# Patient Record
Sex: MALE | Race: WHITE | NOT HISPANIC OR LATINO | URBAN - METROPOLITAN AREA
[De-identification: names, ages, dates, MRNs, and addresses within clinical notes are randomized per-mention and may not be internally consistent; named-entity substitution may affect disease eponyms.]

---

## 2018-10-09 ENCOUNTER — EMERGENCY (EMERGENCY)
Facility: HOSPITAL | Age: 73
LOS: 1 days | Discharge: HOME | End: 2018-10-09
Attending: EMERGENCY MEDICINE
Payer: COMMERCIAL

## 2018-10-09 VITALS
DIASTOLIC BLOOD PRESSURE: 559 MMHG | HEART RATE: 58 BPM | SYSTOLIC BLOOD PRESSURE: 125 MMHG | RESPIRATION RATE: 18 BRPM | TEMPERATURE: 98 F | OXYGEN SATURATION: 96 %

## 2018-10-09 VITALS — WEIGHT: 220.02 LBS

## 2018-10-09 LAB
ALBUMIN SERPL ELPH-MCNC: 4 G/DL — SIGNIFICANT CHANGE UP (ref 3.3–5.2)
ALP SERPL-CCNC: 69 U/L — SIGNIFICANT CHANGE UP (ref 40–120)
ALT FLD-CCNC: 119 U/L — HIGH
ANION GAP SERPL CALC-SCNC: 13 MMOL/L — SIGNIFICANT CHANGE UP (ref 5–17)
APPEARANCE UR: CLEAR — SIGNIFICANT CHANGE UP
APTT BLD: 29.1 SEC — SIGNIFICANT CHANGE UP (ref 27.5–37.4)
AST SERPL-CCNC: 100 U/L — HIGH
BACTERIA # UR AUTO: ABNORMAL
BASOPHILS # BLD AUTO: 0 K/UL — SIGNIFICANT CHANGE UP (ref 0–0.2)
BASOPHILS NFR BLD AUTO: 0.2 % — SIGNIFICANT CHANGE UP (ref 0–2)
BILIRUB SERPL-MCNC: 0.6 MG/DL — SIGNIFICANT CHANGE UP (ref 0.4–2)
BILIRUB UR-MCNC: NEGATIVE — SIGNIFICANT CHANGE UP
BUN SERPL-MCNC: 16 MG/DL — SIGNIFICANT CHANGE UP (ref 8–20)
CALCIUM SERPL-MCNC: 9.1 MG/DL — SIGNIFICANT CHANGE UP (ref 8.6–10.2)
CHLORIDE SERPL-SCNC: 96 MMOL/L — LOW (ref 98–107)
CO2 SERPL-SCNC: 24 MMOL/L — SIGNIFICANT CHANGE UP (ref 22–29)
COLOR SPEC: YELLOW — SIGNIFICANT CHANGE UP
CREAT SERPL-MCNC: 0.92 MG/DL — SIGNIFICANT CHANGE UP (ref 0.5–1.3)
DIFF PNL FLD: ABNORMAL
EOSINOPHIL # BLD AUTO: 0 K/UL — SIGNIFICANT CHANGE UP (ref 0–0.5)
EOSINOPHIL NFR BLD AUTO: 0.4 % — SIGNIFICANT CHANGE UP (ref 0–6)
EPI CELLS # UR: SIGNIFICANT CHANGE UP
GLUCOSE SERPL-MCNC: 141 MG/DL — HIGH (ref 70–115)
GLUCOSE UR QL: NEGATIVE MG/DL — SIGNIFICANT CHANGE UP
HCT VFR BLD CALC: 42.7 % — SIGNIFICANT CHANGE UP (ref 42–52)
HGB BLD-MCNC: 14.7 G/DL — SIGNIFICANT CHANGE UP (ref 14–18)
INR BLD: 1.26 RATIO — HIGH (ref 0.88–1.16)
KETONES UR-MCNC: NEGATIVE — SIGNIFICANT CHANGE UP
LACTATE BLDV-MCNC: 1.2 MMOL/L — SIGNIFICANT CHANGE UP (ref 0.5–2)
LEUKOCYTE ESTERASE UR-ACNC: ABNORMAL
LYMPHOCYTES # BLD AUTO: 0.7 K/UL — LOW (ref 1–4.8)
LYMPHOCYTES # BLD AUTO: 14.3 % — LOW (ref 20–55)
MCHC RBC-ENTMCNC: 29.9 PG — SIGNIFICANT CHANGE UP (ref 27–31)
MCHC RBC-ENTMCNC: 34.4 G/DL — SIGNIFICANT CHANGE UP (ref 32–36)
MCV RBC AUTO: 86.8 FL — SIGNIFICANT CHANGE UP (ref 80–94)
MONOCYTES # BLD AUTO: 0.4 K/UL — SIGNIFICANT CHANGE UP (ref 0–0.8)
MONOCYTES NFR BLD AUTO: 8.9 % — SIGNIFICANT CHANGE UP (ref 3–10)
NEUTROPHILS # BLD AUTO: 3.6 K/UL — SIGNIFICANT CHANGE UP (ref 1.8–8)
NEUTROPHILS NFR BLD AUTO: 76 % — HIGH (ref 37–73)
NITRITE UR-MCNC: NEGATIVE — SIGNIFICANT CHANGE UP
PH UR: 6 — SIGNIFICANT CHANGE UP (ref 5–8)
PLATELET # BLD AUTO: 179 K/UL — SIGNIFICANT CHANGE UP (ref 150–400)
POTASSIUM SERPL-MCNC: 4.2 MMOL/L — SIGNIFICANT CHANGE UP (ref 3.5–5.3)
POTASSIUM SERPL-SCNC: 4.2 MMOL/L — SIGNIFICANT CHANGE UP (ref 3.5–5.3)
PROT SERPL-MCNC: 6.8 G/DL — SIGNIFICANT CHANGE UP (ref 6.6–8.7)
PROT UR-MCNC: 15 MG/DL
PROTHROM AB SERPL-ACNC: 13.9 SEC — HIGH (ref 9.8–12.7)
RBC # BLD: 4.92 M/UL — SIGNIFICANT CHANGE UP (ref 4.6–6.2)
RBC # FLD: 13.2 % — SIGNIFICANT CHANGE UP (ref 11–15.6)
RBC CASTS # UR COMP ASSIST: ABNORMAL /HPF (ref 0–4)
SODIUM SERPL-SCNC: 133 MMOL/L — LOW (ref 135–145)
SP GR SPEC: 1.01 — SIGNIFICANT CHANGE UP (ref 1.01–1.02)
UROBILINOGEN FLD QL: NEGATIVE MG/DL — SIGNIFICANT CHANGE UP
WBC # BLD: 4.7 K/UL — LOW (ref 4.8–10.8)
WBC # FLD AUTO: 4.7 K/UL — LOW (ref 4.8–10.8)
WBC UR QL: SIGNIFICANT CHANGE UP

## 2018-10-09 PROCEDURE — 96365 THER/PROPH/DIAG IV INF INIT: CPT

## 2018-10-09 PROCEDURE — 74176 CT ABD & PELVIS W/O CONTRAST: CPT

## 2018-10-09 PROCEDURE — 85730 THROMBOPLASTIN TIME PARTIAL: CPT

## 2018-10-09 PROCEDURE — 96367 TX/PROPH/DG ADDL SEQ IV INF: CPT

## 2018-10-09 PROCEDURE — 96366 THER/PROPH/DIAG IV INF ADDON: CPT

## 2018-10-09 PROCEDURE — 80053 COMPREHEN METABOLIC PANEL: CPT

## 2018-10-09 PROCEDURE — 99284 EMERGENCY DEPT VISIT MOD MDM: CPT | Mod: 25

## 2018-10-09 PROCEDURE — 71045 X-RAY EXAM CHEST 1 VIEW: CPT | Mod: 26

## 2018-10-09 PROCEDURE — 85610 PROTHROMBIN TIME: CPT

## 2018-10-09 PROCEDURE — 85027 COMPLETE CBC AUTOMATED: CPT

## 2018-10-09 PROCEDURE — 83605 ASSAY OF LACTIC ACID: CPT

## 2018-10-09 PROCEDURE — 71045 X-RAY EXAM CHEST 1 VIEW: CPT

## 2018-10-09 PROCEDURE — 87040 BLOOD CULTURE FOR BACTERIA: CPT

## 2018-10-09 PROCEDURE — 74176 CT ABD & PELVIS W/O CONTRAST: CPT | Mod: 26

## 2018-10-09 PROCEDURE — 87086 URINE CULTURE/COLONY COUNT: CPT

## 2018-10-09 PROCEDURE — 81001 URINALYSIS AUTO W/SCOPE: CPT

## 2018-10-09 PROCEDURE — 36415 COLL VENOUS BLD VENIPUNCTURE: CPT

## 2018-10-09 RX ORDER — SODIUM CHLORIDE 9 MG/ML
3000 INJECTION INTRAMUSCULAR; INTRAVENOUS; SUBCUTANEOUS ONCE
Qty: 0 | Refills: 0 | Status: COMPLETED | OUTPATIENT
Start: 2018-10-09 | End: 2018-10-09

## 2018-10-09 RX ORDER — PIPERACILLIN AND TAZOBACTAM 4; .5 G/20ML; G/20ML
3.38 INJECTION, POWDER, LYOPHILIZED, FOR SOLUTION INTRAVENOUS ONCE
Qty: 0 | Refills: 0 | Status: COMPLETED | OUTPATIENT
Start: 2018-10-09 | End: 2018-10-09

## 2018-10-09 RX ORDER — ACETAMINOPHEN 500 MG
650 TABLET ORAL ONCE
Qty: 0 | Refills: 0 | Status: COMPLETED | OUTPATIENT
Start: 2018-10-09 | End: 2018-10-09

## 2018-10-09 RX ORDER — IBUPROFEN 200 MG
800 TABLET ORAL ONCE
Qty: 0 | Refills: 0 | Status: COMPLETED | OUTPATIENT
Start: 2018-10-09 | End: 2018-10-09

## 2018-10-09 RX ORDER — VANCOMYCIN HCL 1 G
1000 VIAL (EA) INTRAVENOUS ONCE
Qty: 0 | Refills: 0 | Status: COMPLETED | OUTPATIENT
Start: 2018-10-09 | End: 2018-10-09

## 2018-10-09 RX ADMIN — SODIUM CHLORIDE 3000 MILLILITER(S): 9 INJECTION INTRAMUSCULAR; INTRAVENOUS; SUBCUTANEOUS at 11:02

## 2018-10-09 RX ADMIN — SODIUM CHLORIDE 3000 MILLILITER(S): 9 INJECTION INTRAMUSCULAR; INTRAVENOUS; SUBCUTANEOUS at 07:53

## 2018-10-09 RX ADMIN — Medication 650 MILLIGRAM(S): at 07:53

## 2018-10-09 RX ADMIN — Medication 800 MILLIGRAM(S): at 10:03

## 2018-10-09 RX ADMIN — PIPERACILLIN AND TAZOBACTAM 3.38 GRAM(S): 4; .5 INJECTION, POWDER, LYOPHILIZED, FOR SOLUTION INTRAVENOUS at 09:41

## 2018-10-09 RX ADMIN — Medication 650 MILLIGRAM(S): at 09:00

## 2018-10-09 RX ADMIN — Medication 800 MILLIGRAM(S): at 11:02

## 2018-10-09 RX ADMIN — Medication 1000 MILLIGRAM(S): at 11:02

## 2018-10-09 RX ADMIN — PIPERACILLIN AND TAZOBACTAM 200 GRAM(S): 4; .5 INJECTION, POWDER, LYOPHILIZED, FOR SOLUTION INTRAVENOUS at 07:53

## 2018-10-09 RX ADMIN — Medication 250 MILLIGRAM(S): at 09:41

## 2018-10-09 NOTE — ED PROVIDER NOTE - PROGRESS NOTE DETAILS
The patient appears well and feels well and has no symptoms and wants to go home. No HA, No CP, No SOB, No cough, No abd pain currently.  Lab WNL and will DC home and follow up with PMD

## 2018-10-09 NOTE — ED PROVIDER NOTE - OBJECTIVE STATEMENT
The patient is a 72 year old male presents with fever, dysuria, hesitancy and bodyache. No CP, No SOB, No sick contact, No abd pain

## 2018-10-11 LAB
CULTURE RESULTS: SIGNIFICANT CHANGE UP
SPECIMEN SOURCE: SIGNIFICANT CHANGE UP

## 2018-10-14 LAB
CULTURE RESULTS: SIGNIFICANT CHANGE UP
CULTURE RESULTS: SIGNIFICANT CHANGE UP
SPECIMEN SOURCE: SIGNIFICANT CHANGE UP
SPECIMEN SOURCE: SIGNIFICANT CHANGE UP

## 2021-08-17 PROBLEM — I10 ESSENTIAL (PRIMARY) HYPERTENSION: Chronic | Status: ACTIVE | Noted: 2018-10-09

## 2021-08-17 PROBLEM — K21.9 GASTRO-ESOPHAGEAL REFLUX DISEASE WITHOUT ESOPHAGITIS: Chronic | Status: ACTIVE | Noted: 2018-10-09

## 2021-08-19 PROBLEM — Z00.00 ENCOUNTER FOR PREVENTIVE HEALTH EXAMINATION: Status: ACTIVE | Noted: 2021-08-19

## 2021-08-20 ENCOUNTER — APPOINTMENT (OUTPATIENT)
Dept: MRI IMAGING | Facility: CLINIC | Age: 76
End: 2021-08-20
Payer: MEDICARE

## 2021-08-20 ENCOUNTER — OUTPATIENT (OUTPATIENT)
Dept: OUTPATIENT SERVICES | Facility: HOSPITAL | Age: 76
LOS: 1 days | End: 2021-08-20
Payer: MEDICARE

## 2021-08-20 DIAGNOSIS — Z00.00 ENCOUNTER FOR GENERAL ADULT MEDICAL EXAMINATION WITHOUT ABNORMAL FINDINGS: ICD-10-CM

## 2021-08-20 PROCEDURE — 73721 MRI JNT OF LWR EXTRE W/O DYE: CPT | Mod: MH

## 2021-08-20 PROCEDURE — 73721 MRI JNT OF LWR EXTRE W/O DYE: CPT | Mod: 26,LT,MH

## 2021-10-04 ENCOUNTER — TRANSCRIPTION ENCOUNTER (OUTPATIENT)
Age: 76
End: 2021-10-04

## 2022-09-04 ENCOUNTER — NON-APPOINTMENT (OUTPATIENT)
Age: 77
End: 2022-09-04

## 2022-09-19 ENCOUNTER — APPOINTMENT (OUTPATIENT)
Dept: ORTHOPEDIC SURGERY | Facility: CLINIC | Age: 77
End: 2022-09-19

## 2022-09-19 VITALS — WEIGHT: 220 LBS | BODY MASS INDEX: 32.58 KG/M2 | HEIGHT: 69 IN

## 2022-09-19 DIAGNOSIS — Z78.9 OTHER SPECIFIED HEALTH STATUS: ICD-10-CM

## 2022-09-19 DIAGNOSIS — Z63.4 DISAPPEARANCE AND DEATH OF FAMILY MEMBER: ICD-10-CM

## 2022-09-19 PROCEDURE — 99214 OFFICE O/P EST MOD 30 MIN: CPT

## 2022-09-19 SDOH — SOCIAL STABILITY - SOCIAL INSECURITY: DISSAPEARANCE AND DEATH OF FAMILY MEMBER: Z63.4

## 2022-09-19 NOTE — DISCUSSION/SUMMARY
[de-identified] : Patient will begin pt for hip OA. He will follow up with our hip service if his symptoms continue. \par \par "Written by Andrew Briones, acting as Scribe for Brian Fabian M.D" \par \par Home Exercise \par \par  The patient is instructed on a home exercise program. \par  \par RICE \par I explained to the patient that rest, ice, compression, and elevation would benefit them.  They may return to activity after follow-up or when they no longer have any pain. \par  \par Pain Guide Activities \par The patient was advised to let pain guide the gradual advancement of activities. \par  \par Activity Modification \par The patient was advised to modify their activities. \par  \par Dx / Natural History \par The patient was advised of the diagnosis.  The natural history of the pathology was explained in full to the patient in layman's terms.  Several different treatment options were discussed and explained in full to the patient including the risks and benefits of both surgical and non-surgical treatments.  All questions and concerns were answered.\par

## 2022-09-19 NOTE — PHYSICAL EXAM
[5___] : extension 5[unfilled]/5 [2+] : posterior tibialis pulse: 2+ [] : patient ambulates without assistive device [Right] : right hip with pelvis [de-identified] : Neurologic: normal coordination, normal DTR UE/LE , normal sensation, normal mood and affect, orientated and able to communicate.\par \par Skin: normal skin, no rash, no ulcers and no lesions.\par \par Lymphatic: no obvious lymphadenopathy in areas examined.\par \par Constitutional: well developed and well nourished.\par \par Cardiovascular: peripheral vascular exam is grossly normal.\par \par Pulmonary: no respiratory distress, lungs clear to auscultation bilaterally.\par \par Abdomen: normal bowel sounds, non-tender, no HSM and no mass.\par  [FreeTextEntry3] : gluteal tenderness, lateral thigh tenderness.  [FreeTextEntry9] : [facility] 9/5/22 rt hip xray: Mild right hip arthrosis.

## 2022-09-19 NOTE — HISTORY OF PRESENT ILLNESS
[de-identified] : The patient is a 76 year old R hand dominant male who presents today complaining of right hip pain.  \par Date of Injury/Onset: 10 days\par Pain:    At Rest: _/10 \par With Activity:  _/10 \par Mechanism of injury: took a fall playing racquet ball\par This is not a Work Related Injury being treated under Worker's Compensation.\par This is not an athletic injury occurring associated with an interscholastic or organized sports team.\par Quality of symptoms: sharp ache\par Improves with:  ice, volteran\par Worse with: weight bearing\par Prior treatment:  GP\par Prior Imaging: [facility]\par Out of work/sport: _, since _\par School/Sport/Position/Occupation:_\par Additional Information: None\par \par

## 2023-06-20 ENCOUNTER — APPOINTMENT (OUTPATIENT)
Dept: ORTHOPEDIC SURGERY | Facility: CLINIC | Age: 78
End: 2023-06-20
Payer: MEDICARE

## 2023-06-20 PROCEDURE — 73110 X-RAY EXAM OF WRIST: CPT | Mod: RT

## 2023-06-20 PROCEDURE — 99214 OFFICE O/P EST MOD 30 MIN: CPT

## 2023-06-20 NOTE — IMAGING
[de-identified] : RIGHT HAND EXAM\par +ttp at fovea/ulnar styloid\par Skin intact\par No deformity, edema, ecchymosis\par Warm and well perfused\par Brisk capillary refill throughout\par Motor function intact AIN, PIN, and ulnar nerves\par Sensation intact to light touch in median, ulnar, and radial nerves\par Finger and wrist motion is intact and full\par Patient is able to make a composite fist\par \par Right wrist radiographs with no overt fracture nor dislocation. Carpus aligned.

## 2023-06-20 NOTE — HISTORY OF PRESENT ILLNESS
[de-identified] : 77M, RHD presents with right wrist pain due to playing racket ball end of May 2023, reports falling on stone floor 2 weeks after. Patient admits to experiencing soreness that has progressively got worst. Admits to using Voltaren with no relief, using a brace with significant relief. Denies numbness/ tingling.

## 2023-06-20 NOTE — ASSESSMENT
[FreeTextEntry1] : Right wrist sprain/contusion - reviewed radiographs and overall pathoanatomy with patient. Discussed management will consist of NSAIDs prn, wrist brace, activity modification.\par \par F/u 3 weeks

## 2023-07-11 ENCOUNTER — APPOINTMENT (OUTPATIENT)
Dept: ORTHOPEDIC SURGERY | Facility: CLINIC | Age: 78
End: 2023-07-11
Payer: MEDICARE

## 2023-07-11 DIAGNOSIS — S63.509A UNSPECIFIED SPRAIN OF UNSPECIFIED WRIST, INITIAL ENCOUNTER: ICD-10-CM

## 2023-07-11 PROCEDURE — 99213 OFFICE O/P EST LOW 20 MIN: CPT

## 2023-07-11 NOTE — IMAGING
[de-identified] : RIGHT HAND EXAM\par +ttp at fovea/ulnar styloid\par Skin intact\par No deformity, edema, ecchymosis\par Warm and well perfused\par Brisk capillary refill throughout\par Motor function intact AIN, PIN, and ulnar nerves\par Sensation intact to light touch in median, ulnar, and radial nerves\par Finger and wrist motion is intact and full\par Patient is able to make a composite fist\par \par Right wrist radiographs with no overt fracture nor dislocation. Carpus aligned.

## 2023-07-11 NOTE — ASSESSMENT
[FreeTextEntry1] : Right wrist sprain/contusion - reviewed radiographs and overall pathoanatomy with patient. Discussed management will consist of NSAIDs prn, wrist brace, activity modification.\par \par F/u prn

## 2023-07-11 NOTE — HISTORY OF PRESENT ILLNESS
[de-identified] : 77M, RHD presents with right wrist pain due to playing racket ball end of May 2023, reports falling on stone floor 2 weeks after. Patient admits to experiencing soreness that has progressively got worst. Admits to using Voltaren with no relief, using a brace with significant relief. Denies numbness/ tingling. \par \par 7/11/23: f/u right wrist pain. Patient reports continues to experience pain. Denies OT/ PT treatment. Denies numbness/ tingling.

## 2023-09-13 ENCOUNTER — NON-APPOINTMENT (OUTPATIENT)
Age: 78
End: 2023-09-13

## 2024-02-27 ENCOUNTER — APPOINTMENT (OUTPATIENT)
Dept: PULMONOLOGY | Facility: CLINIC | Age: 79
End: 2024-02-27
Payer: MEDICARE

## 2024-02-27 VITALS
DIASTOLIC BLOOD PRESSURE: 68 MMHG | HEART RATE: 72 BPM | WEIGHT: 195 LBS | OXYGEN SATURATION: 97 % | TEMPERATURE: 97.2 F | SYSTOLIC BLOOD PRESSURE: 161 MMHG | BODY MASS INDEX: 28.88 KG/M2 | HEIGHT: 69 IN

## 2024-02-27 PROCEDURE — 99204 OFFICE O/P NEW MOD 45 MIN: CPT

## 2024-02-27 NOTE — CONSULT LETTER
[Dear  ___] : Dear  [unfilled], [FreeTextEntry1] : I had the pleasure of evaluating your patient, LISA CARRANZA , in the office today.  Please review my consultation and evaluation report that follows below.  Please do not hesitate to call me if further information is necessary or if you wish to discuss ongoing care or diagnostic work-up.    I very much appreciate your referral and it is a privilege to be able to provide care for your patient.  Sincerely,   Terrell Helm MD, MHCM, FACP, LIVAN-C Pulmonary Medicine  of Medicine [employer] [employer] [facility] [employer] at [facility] [email or web address] -Pulmonary in [address] [place]  [address]    Fax   Multi-Specialties at [address]  [phone]

## 2024-02-27 NOTE — PHYSICAL EXAM
[No Acute Distress] : no acute distress [Normal Oropharynx] : normal oropharynx [Normal Appearance] : normal appearance [Normal Rate/Rhythm] : normal rate/rhythm [No Neck Mass] : no neck mass [Normal S1, S2] : normal s1, s2 [No Resp Distress] : no resp distress [No Murmurs] : no murmurs [Clear to Auscultation Bilaterally] : clear to auscultation bilaterally [No Abnormalities] : no abnormalities [Benign] : benign [Normal Gait] : normal gait [No Clubbing] : no clubbing [No Cyanosis] : no cyanosis [No Edema] : no edema [FROM] : FROM [No Focal Deficits] : no focal deficits [Normal Color/ Pigmentation] : normal color/ pigmentation [Normal Affect] : normal affect [Oriented x3] : oriented x3

## 2024-02-27 NOTE — ASSESSMENT
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 HLD on no meds Advantage chart reviewed  Patient was worred about lung cancer--sent for CT at [facility] in [place] : Reviewed with patient 6 mm nodule surrounding a small bullae in lingula, also 3 mm nodule in Lingula--only issues  No histroy of TB, [place] [employer] [place]  Imp 77 yo gentleman with history 30+py smoking until 30 years ago HT and GERD noted CT chest done for concerns about lung cancer (wife  of LC) showed 6 mm and 3mm lesions in lingula Somewhat concerned about peribullous lesion Will obtain 3 month interim at [facility] and follow this lesion

## 2024-02-27 NOTE — HISTORY OF PRESENT ILLNESS
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 HLD on no meds Advantage chart reviewed  Patient was worred about lung cancer--sent for CT at [facility] in [place] Jan: Reviewed with patient 6 mm nodule surrounding a small bullae in lingula, also 3 mm nodule in Lingula--only issues  No histroy of TB, born and raised in [place] and [employer] [place] in [place]

## 2024-04-10 ENCOUNTER — NON-APPOINTMENT (OUTPATIENT)
Age: 79
End: 2024-04-10

## 2024-04-11 NOTE — ED ADULT NURSE NOTE - CADM POA URETHRAL CATHETER
ENDOSCOPY DISCHARGE INSTRUCTIONS    You may experience some lightheadedness for the next several hours.  Plan on quiet relaxation for the rest of today.  A responsible adult needs to stay with you today.  Because of the medications you received today-do not drive,operate machinery,or sign any contractual agreement for the next 24 hours.  Do not drink any alcoholic beverages or take any unprescribed medications tonight.  Eat bland food and avoid anything greasy or spicy initially-progress to your normal diet gradually.  Diet restrictions as instructed.  If you have any of the following problems, notify your physician or return to the hospital emergency room : fever, chills, excessive bleeding, excessive vomiting, difficulty swallowing, uncontrolled pain, increased abdominal distention, shortness of breath or any other problems.  If you have a sore throat, you may use lozenges or salt water gargles.  Please call Dr. Harris's office in 5 business days for biopsy results [phone].      ANESTHESIA DISCHARGE INSTRUCTIONS    Wear your seatbelt home.  You are under the influence of drugs-do not drink alcohol,drive,operate machinery,or make any important decisions or sign any legal documentsfor 24 hours  Children should not ride bikes,skate boards or play on gym sets  for 24 hours after surgery.  A responsible adult needs to be with you for 24 hours.  You may experience lightheadedness,dizziness,or sleepiness following surgery.  Rest at home today- increase activity as tolerated.  Progress slowly to a regular diet unless your physician has instructed you otherwise.Drink plenty of water.  If nausea becomes a problem call your physician.  Coughing,sore throat,and muscle aches are other side effects of anesthesia,and should improve with time.  Do not drive,operate machinery while taking narcotics.    
No
Billing Type: Third-Party Bill

## 2024-04-12 ENCOUNTER — APPOINTMENT (OUTPATIENT)
Dept: PULMONOLOGY | Facility: CLINIC | Age: 79
End: 2024-04-12
Payer: MEDICARE

## 2024-04-12 VITALS
HEART RATE: 74 BPM | SYSTOLIC BLOOD PRESSURE: 130 MMHG | DIASTOLIC BLOOD PRESSURE: 64 MMHG | OXYGEN SATURATION: 98 % | RESPIRATION RATE: 14 BRPM

## 2024-04-12 DIAGNOSIS — Z87.891 PERSONAL HISTORY OF NICOTINE DEPENDENCE: ICD-10-CM

## 2024-04-12 DIAGNOSIS — R91.1 SOLITARY PULMONARY NODULE: ICD-10-CM

## 2024-04-12 PROCEDURE — G2211 COMPLEX E/M VISIT ADD ON: CPT

## 2024-04-12 PROCEDURE — 85018 HEMOGLOBIN: CPT | Mod: QW

## 2024-04-12 PROCEDURE — 94729 DIFFUSING CAPACITY: CPT

## 2024-04-12 PROCEDURE — 94727 GAS DIL/WSHOT DETER LNG VOL: CPT

## 2024-04-12 PROCEDURE — 94010 BREATHING CAPACITY TEST: CPT

## 2024-04-12 PROCEDURE — 99213 OFFICE O/P EST LOW 20 MIN: CPT | Mod: 25

## 2024-04-12 PROCEDURE — 99213 OFFICE O/P EST LOW 20 MIN: CPT

## 2024-04-22 NOTE — ASSESSMENT
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 HLD on no meds Advantage chart reviewed  Patient was worried about lung cancer--sent for CT at [place] in [place] Jan: Reviewed with patient 6 mm nodule surrounding a small bullae in lingula, also 3 mm nodule in Lingula--only issues   Interim: Patient returns today: PFTs were reviewed with patient: No evidence of obstruction or restriction is noted   The patient had repeat CT scan obtain at [facility] last week Report is NOT available Reviewed films with the patient The nodule in lingula adjacent to bulla does not appear any different at this time   Imp 79 yo man with 30 py hx of smoking until 30 yrs ago His wife passed away due to lung cancer HT and GERD noted CT chest suggests stable lesion IF this is confirmed by the final reading, will repeat lung CT in 6 months and then return for f/u  ADDENDUM: Final reading confirms above Will plan 6 month repeat CT and then return here as originally planned and ordered Left message for patient

## 2024-04-22 NOTE — HISTORY OF PRESENT ILLNESS
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 HLD on no meds Advantage chart reviewed  Patient was worried about lung cancer--sent for CT at [facility] in [place] Jan: Reviewed with patient 6 mm nodule surrounding a small bullae in lingula, also 3 mm nodule in Lingula--only issues   Interim: Patient returns today: PFTs were reviewed with patient: No evidence of obstruction or restriction is noted   The patient had repeat CT scan obtain at [facility] last week Report is NOT available Reviewed films with the patient The nodule in lingula adjacent to bulla does not appear any different at this time  No histroy of TB, born and raised in [place] and [employer] in [place]. 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 HLD on no meds Advantage chart reviewed  Patient was worred about lung cancer--sent for CT at [facility] in [place] Jan: Reviewed with patient 6 mm nodule surrounding a small bullae in lingula, also 3 mm nodule in Lingula--only issues  No histroy of TB, born and raised in [place] and [employer] in [place].

## 2024-04-22 NOTE — CONSULT LETTER
[Dear  ___] : Dear  [unfilled], [FreeTextEntry1] : I had the pleasure of evaluating your patient, LISA CARRANZA , in the office today.  Please review my consultation and evaluation report that follows below.  Please do not hesitate to call me if further information is necessary or if you wish to discuss ongoing care or diagnostic work-up.    I very much appreciate your referral and it is a privilege to be able to provide care for your patient.  Sincerely,   Terrell Helm MD, MHCM, FACP, LIVAN-C Pulmonary Medicine  of Medicine [employer] [facility] [employer] [facility] at [facility] [email or web address] -[facility]ulmonary in [address] [place]  [address]    Fax   Multi-Specialties at [address]  [phone]

## 2024-04-23 ENCOUNTER — INPATIENT (INPATIENT)
Facility: HOSPITAL | Age: 79
LOS: 1 days | Discharge: HOME | DRG: 41 | End: 2024-04-25
Attending: PSYCHIATRY & NEUROLOGY | Admitting: STUDENT IN AN ORGANIZED HEALTH CARE EDUCATION/TRAINING PROGRAM
Payer: MEDICARE

## 2024-04-23 VITALS
DIASTOLIC BLOOD PRESSURE: 67 MMHG | HEART RATE: 51 BPM | OXYGEN SATURATION: 98 % | RESPIRATION RATE: 15 BRPM | TEMPERATURE: 98 F | SYSTOLIC BLOOD PRESSURE: 167 MMHG

## 2024-04-23 LAB
ALBUMIN SERPL ELPH-MCNC: 4.4 G/DL — SIGNIFICANT CHANGE UP (ref 3.3–5.2)
ALP SERPL-CCNC: 45 U/L — SIGNIFICANT CHANGE UP (ref 40–120)
ALT FLD-CCNC: 27 U/L — SIGNIFICANT CHANGE UP
ANION GAP SERPL CALC-SCNC: 11 MMOL/L — SIGNIFICANT CHANGE UP (ref 5–17)
APTT BLD: 30.5 SEC — SIGNIFICANT CHANGE UP (ref 24.5–35.6)
AST SERPL-CCNC: 27 U/L — SIGNIFICANT CHANGE UP
BASOPHILS # BLD AUTO: 0.04 K/UL — SIGNIFICANT CHANGE UP (ref 0–0.2)
BASOPHILS NFR BLD AUTO: 0.6 % — SIGNIFICANT CHANGE UP (ref 0–2)
BILIRUB SERPL-MCNC: 0.4 MG/DL — SIGNIFICANT CHANGE UP (ref 0.4–2)
BUN SERPL-MCNC: 21.9 MG/DL — HIGH (ref 8–20)
CALCIUM SERPL-MCNC: 9.8 MG/DL — SIGNIFICANT CHANGE UP (ref 8.4–10.5)
CHLORIDE SERPL-SCNC: 101 MMOL/L — SIGNIFICANT CHANGE UP (ref 96–108)
CO2 SERPL-SCNC: 27 MMOL/L — SIGNIFICANT CHANGE UP (ref 22–29)
CREAT SERPL-MCNC: 0.84 MG/DL — SIGNIFICANT CHANGE UP (ref 0.5–1.3)
EGFR: 89 ML/MIN/1.73M2 — SIGNIFICANT CHANGE UP
EOSINOPHIL # BLD AUTO: 0.22 K/UL — SIGNIFICANT CHANGE UP (ref 0–0.5)
EOSINOPHIL NFR BLD AUTO: 3.4 % — SIGNIFICANT CHANGE UP (ref 0–6)
GLUCOSE SERPL-MCNC: 97 MG/DL — SIGNIFICANT CHANGE UP (ref 70–99)
HCT VFR BLD CALC: 41.1 % — SIGNIFICANT CHANGE UP (ref 39–50)
HGB BLD-MCNC: 14 G/DL — SIGNIFICANT CHANGE UP (ref 13–17)
IMM GRANULOCYTES NFR BLD AUTO: 0.5 % — SIGNIFICANT CHANGE UP (ref 0–0.9)
INR BLD: 1.03 RATIO — SIGNIFICANT CHANGE UP (ref 0.85–1.18)
LYMPHOCYTES # BLD AUTO: 2.67 K/UL — SIGNIFICANT CHANGE UP (ref 1–3.3)
LYMPHOCYTES # BLD AUTO: 40.8 % — SIGNIFICANT CHANGE UP (ref 13–44)
MCHC RBC-ENTMCNC: 30.8 PG — SIGNIFICANT CHANGE UP (ref 27–34)
MCHC RBC-ENTMCNC: 34.1 GM/DL — SIGNIFICANT CHANGE UP (ref 32–36)
MCV RBC AUTO: 90.3 FL — SIGNIFICANT CHANGE UP (ref 80–100)
MONOCYTES # BLD AUTO: 0.56 K/UL — SIGNIFICANT CHANGE UP (ref 0–0.9)
MONOCYTES NFR BLD AUTO: 8.6 % — SIGNIFICANT CHANGE UP (ref 2–14)
NEUTROPHILS # BLD AUTO: 3.02 K/UL — SIGNIFICANT CHANGE UP (ref 1.8–7.4)
NEUTROPHILS NFR BLD AUTO: 46.1 % — SIGNIFICANT CHANGE UP (ref 43–77)
PLATELET # BLD AUTO: 233 K/UL — SIGNIFICANT CHANGE UP (ref 150–400)
POTASSIUM SERPL-MCNC: 4.7 MMOL/L — SIGNIFICANT CHANGE UP (ref 3.5–5.3)
POTASSIUM SERPL-SCNC: 4.7 MMOL/L — SIGNIFICANT CHANGE UP (ref 3.5–5.3)
PROT SERPL-MCNC: 6.7 G/DL — SIGNIFICANT CHANGE UP (ref 6.6–8.7)
PROTHROM AB SERPL-ACNC: 11.4 SEC — SIGNIFICANT CHANGE UP (ref 9.5–13)
RBC # BLD: 4.55 M/UL — SIGNIFICANT CHANGE UP (ref 4.2–5.8)
RBC # FLD: 13.8 % — SIGNIFICANT CHANGE UP (ref 10.3–14.5)
SODIUM SERPL-SCNC: 139 MMOL/L — SIGNIFICANT CHANGE UP (ref 135–145)
TROPONIN T, HIGH SENSITIVITY RESULT: 15 NG/L — SIGNIFICANT CHANGE UP (ref 0–51)
WBC # BLD: 6.54 K/UL — SIGNIFICANT CHANGE UP (ref 3.8–10.5)
WBC # FLD AUTO: 6.54 K/UL — SIGNIFICANT CHANGE UP (ref 3.8–10.5)

## 2024-04-23 PROCEDURE — 93010 ELECTROCARDIOGRAM REPORT: CPT

## 2024-04-23 PROCEDURE — 0042T: CPT | Mod: MC

## 2024-04-23 PROCEDURE — 99291 CRITICAL CARE FIRST HOUR: CPT

## 2024-04-23 PROCEDURE — 70498 CT ANGIOGRAPHY NECK: CPT | Mod: 26,MC

## 2024-04-23 PROCEDURE — 70496 CT ANGIOGRAPHY HEAD: CPT | Mod: 26,MC

## 2024-04-23 PROCEDURE — 70450 CT HEAD/BRAIN W/O DYE: CPT | Mod: 26,59,MC

## 2024-04-23 RX ORDER — ASPIRIN/CALCIUM CARB/MAGNESIUM 324 MG
325 TABLET ORAL DAILY
Refills: 0 | Status: DISCONTINUED | OUTPATIENT
Start: 2024-04-23 | End: 2024-04-24

## 2024-04-23 RX ORDER — CLOPIDOGREL BISULFATE 75 MG/1
300 TABLET, FILM COATED ORAL ONCE
Refills: 0 | Status: COMPLETED | OUTPATIENT
Start: 2024-04-23 | End: 2024-04-23

## 2024-04-23 RX ADMIN — CLOPIDOGREL BISULFATE 300 MILLIGRAM(S): 75 TABLET, FILM COATED ORAL at 21:06

## 2024-04-23 RX ADMIN — Medication 325 MILLIGRAM(S): at 21:06

## 2024-04-23 NOTE — ED PROVIDER NOTE - PHYSICAL EXAMINATION
Head: NC/AT  Neck: trachea midline  Resp:  No distress  Abd: soft, nd, nt  Ext: no deformities  Neuro:  Alert. Oriented to person, place. +APHASIA  Skin:  Warm and dry as visualized

## 2024-04-23 NOTE — ED PROVIDER NOTE - ATTENDING CONTRIBUTION TO CARE
seen with resident; code stroke activation, MSU, acute onset word finding difficulties since 1pm; ct with [facility] prehospital; not TNK candidate due to >6hrs, however, neurointerventionalist was contacted upon patient arrival to review perfusion scans. Patient with aphasia and command issues due to understanding on arrival, but symptoms are waxing and waning; see initial NIH below; but NIH resolved to 0 shortly after CT scans were completed. TBA, but interventional procedure cancelled due to improving status.     I personally saw the patient with the resident, and completed the key components of the history and physical exam. I then discussed the management plan with the resident.    I have personally provided _30__ minutes of critical care time exclusive of time spent on separately billable procedures. Time includes review of laboratory data, radiology results, discussion with consultants, and monitoring for potential decompensation. Interventions were performed as documented above

## 2024-04-23 NOTE — ED ADULT TRIAGE NOTE - CHIEF COMPLAINT QUOTE
PT BIB mobile stroke unit, LKW was 1PM today.  PT complaining of expressive aphagia, right sided facial droop.  As per mobile stroke unit pt noted to have a left MCA occlusion.  Symptoms have resolved upon arrival.  Code stroke called, pt brought to CT scan, MD at bedside.

## 2024-04-23 NOTE — ED PROVIDER NOTE - PROGRESS NOTE DETAILS
Meenu: On re-exam pt w/o aphasia. Full neuro exam now w/o significant findings. Code biplane cancelled by interventional team. Pt to be admitted to SDU, dual ap load. q2 neuro checks.

## 2024-04-23 NOTE — ED ADULT NURSE NOTE - ED STAT RN HANDOFF DETAILS
Patient admitted to SDU for NIH q2h.  Pt A&Ox4, current NIH 0.  Aspirin/plavix administered as ordered. VSS with no complaints voiced at this time.

## 2024-04-23 NOTE — ED ADULT NURSE NOTE - OBJECTIVE STATEMENT
Patient presents as code stoke with [facility] stroke unit confirmed left MCA occlusion, LKW 1pm, c/o aphasia, right sided facial droop.   On arrival no symptoms present with NIH score of 0.  MD present on arrival.

## 2024-04-23 NOTE — PATIENT PROFILE ADULT - FALL HARM RISK - RISK INTERVENTIONS

## 2024-04-23 NOTE — PROGRESS NOTE ADULT - SUBJECTIVE AND OBJECTIVE BOX
STROKE ATTENDING ON CALL    Called by ED team--79 y/o M with hx of GERD, HTN, possible lung CA who was LKW 1PM.  Found to be aphasic.  Brought in by [facility] stroke unit--found to have distal LM2 occlusion.  Repeat imaging here shows contrast on noncontrast head CT, possible sulcal effacement in the posterior L temporal lobe.  CTP shows large area of penumbra, no core.  He is out of window for TNK, but have discussed case with Dr. Colin from Neuro IR--patient will be taken for mechanical thrombectomy.  Code BiPlane activated.    STROKE ATTENDING ON CALL    Called by ED team--79 y/o M with hx of GERD, HTN, possible lung CA who was LKW 1PM.  Found to be aphasic.  Brought in by [facility] stroke unit--found to have distal LM2 occlusion.  Repeat imaging here shows contrast on noncontrast head CT, possible sulcal effacement in the posterior L temporal lobe.  CTP shows large area of penumbra, no core.  He is out of window for TNK, but have discussed case with Dr. Colin from Neuro IR--patient will be taken for mechanical thrombectomy.  Code BiPlane activated.       Addendum 8:50PM:  Patient was examined by Dr. Colin and noted to be nonaphasic with NIHSS 0.  Suspected to have recanalized therefore he will not be taken for thrombectomy.  Discussed case with ED attg--will load him with ASA and Plavix and admit to Medicine Stepdown status with stroke workup.

## 2024-04-23 NOTE — ED PROVIDER NOTE - NS ED ROS FT
Review of Systems  SKIN: warm, dry w/ no rash or bleeding  RESPIRATORY: no cough or SOB  CARDIAC: no chest pain & no palpitations  GI: no abd pain, nausea, vomiting, diarrhea  MUSCULOSKELETAL:  no joint pain, swelling or redness  NEURO: Alert, oriented x3. +Trouble finding words.

## 2024-04-23 NOTE — ED PROVIDER NOTE - CLINICAL SUMMARY MEDICAL DECISION MAKING FREE TEXT BOX
ASSESSMENT:   LISA CARRANZA is a 79yo M who presented with aphasia since 1pm. Vitals stable. Physical exam w/ aphasia, benign abdomen, clear lungs.     PLAN: Admit SDU w/ dual ap therapy

## 2024-04-23 NOTE — ED PROVIDER NOTE - OBJECTIVE STATEMENT
LISA CARRANZA is a 79yo Male with PMH HTN, GERD who presents c/o word finding difficult. [facility] M2 [facility]. On arrival pt noted to have difficulty finding words. Taken urgently to CT scan. Stroke neurologist called and interventionalist consulted. Pt initially planned for mechanical thrombectomy but on re-evaluation found to be w/o aphasia and w/o and neurological deficits. Biplane cancelled. On ROS pt denies any symptoms of cp/sob, fevers, chills, nausea, vomiting, abdominal pain, urinary symptoms, weakness, confusion.

## 2024-04-24 DIAGNOSIS — I69.30 UNSPECIFIED SEQUELAE OF CEREBRAL INFARCTION: ICD-10-CM

## 2024-04-24 LAB
A1C WITH ESTIMATED AVERAGE GLUCOSE RESULT: 5.9 % — HIGH (ref 4–5.6)
ESTIMATED AVERAGE GLUCOSE: 123 MG/DL — HIGH (ref 68–114)

## 2024-04-24 PROCEDURE — 70551 MRI BRAIN STEM W/O DYE: CPT | Mod: 26

## 2024-04-24 PROCEDURE — 99223 1ST HOSP IP/OBS HIGH 75: CPT

## 2024-04-24 PROCEDURE — 71045 X-RAY EXAM CHEST 1 VIEW: CPT | Mod: 26

## 2024-04-24 RX ORDER — ASPIRIN/CALCIUM CARB/MAGNESIUM 324 MG
81 TABLET ORAL DAILY
Refills: 0 | Status: DISCONTINUED | OUTPATIENT
Start: 2024-04-24 | End: 2024-04-25

## 2024-04-24 RX ORDER — LOSARTAN POTASSIUM 100 MG/1
1 TABLET, FILM COATED ORAL
Refills: 0 | DISCHARGE

## 2024-04-24 RX ORDER — PANTOPRAZOLE SODIUM 20 MG/1
40 TABLET, DELAYED RELEASE ORAL
Refills: 0 | Status: DISCONTINUED | OUTPATIENT
Start: 2024-04-24 | End: 2024-04-25

## 2024-04-24 RX ORDER — CLOPIDOGREL BISULFATE 75 MG/1
75 TABLET, FILM COATED ORAL EVERY 24 HOURS
Refills: 0 | Status: DISCONTINUED | OUTPATIENT
Start: 2024-04-24 | End: 2024-04-25

## 2024-04-24 RX ORDER — OMEPRAZOLE 10 MG/1
1 CAPSULE, DELAYED RELEASE ORAL
Refills: 0 | DISCHARGE

## 2024-04-24 RX ORDER — ATORVASTATIN CALCIUM 80 MG/1
80 TABLET, FILM COATED ORAL AT BEDTIME
Refills: 0 | Status: DISCONTINUED | OUTPATIENT
Start: 2024-04-24 | End: 2024-04-25

## 2024-04-24 RX ORDER — HEPARIN SODIUM 5000 [USP'U]/ML
5000 INJECTION INTRAVENOUS; SUBCUTANEOUS EVERY 12 HOURS
Refills: 0 | Status: DISCONTINUED | OUTPATIENT
Start: 2024-04-24 | End: 2024-04-25

## 2024-04-24 RX ORDER — SODIUM CHLORIDE 9 MG/ML
1000 INJECTION INTRAMUSCULAR; INTRAVENOUS; SUBCUTANEOUS
Refills: 0 | Status: DISCONTINUED | OUTPATIENT
Start: 2024-04-24 | End: 2024-04-25

## 2024-04-24 RX ADMIN — HEPARIN SODIUM 5000 UNIT(S): 5000 INJECTION INTRAVENOUS; SUBCUTANEOUS at 07:00

## 2024-04-24 RX ADMIN — CLOPIDOGREL BISULFATE 75 MILLIGRAM(S): 75 TABLET, FILM COATED ORAL at 23:30

## 2024-04-24 RX ADMIN — ATORVASTATIN CALCIUM 80 MILLIGRAM(S): 80 TABLET, FILM COATED ORAL at 23:00

## 2024-04-24 RX ADMIN — Medication 81 MILLIGRAM(S): at 12:37

## 2024-04-24 RX ADMIN — PANTOPRAZOLE SODIUM 40 MILLIGRAM(S): 20 TABLET, DELAYED RELEASE ORAL at 07:00

## 2024-04-24 RX ADMIN — HEPARIN SODIUM 5000 UNIT(S): 5000 INJECTION INTRAVENOUS; SUBCUTANEOUS at 23:30

## 2024-04-24 RX ADMIN — SODIUM CHLORIDE 50 MILLILITER(S): 9 INJECTION INTRAMUSCULAR; INTRAVENOUS; SUBCUTANEOUS at 12:44

## 2024-04-24 NOTE — CONSULT NOTE ADULT - ASSESSMENT
INCOMPLETE NOTE    ASSESSMENT:     NEURO:   -Neurologically ---   -Continue close monitoring for neurologic deterioration    - Stroke neuro checks q _   - Permissive HTN or  SBP goal ____ avoiding rapid fluctuations and hypotension    -ANTITHROMBOTIC THERAPY:   -titrate statin to LDL goal less than 70  -MRI Brain w/o, MRA Head w/o and Neck w/contrast  -Dysphagia screen: pass/fail   -Physical therapy/OT/Speech eval/treatment.   -check TTE, cardiac monitoring w/ telemetry for now, further evaluation pending findings of noted workup  , +/- ILR             - patient should have all age and risk appropriate malignancy screenings with PCP or sooner if clinically suspected    -DVT ppx: Heparin s.c [] LMWH [] SCD[]    -maintain adequate hydration    -Na Goal: 135-145   -monitor for si/sx of infection   -Check LDL/A1C  -Stroke education     OTHER:  condition and plan of care d/w patient, questions and concerns addressed.     DISPOSITION: Rehab or home depending on PT eval once stable and workup is complete      CORE MEASURES:        Admission NIHSS:      Tenecteplase : [] YES [] NO      LDL/A1C:     Depression Screen- if depression hx and/or present      Statin Therapy:     Dysphagia Screen: [] PASS [] FAIL     Smoking [] YES [] NO      Afib [] YES [] NO     Stroke Education [] YES [] NO     ASSESSMENT: 77yo M with hx of HTN, GERD presented to ED by [facility] stroke unit with aphasia and found to have distal LM2 occlusion.  Repeat imaging here showed occlusive M2 segment left MCA clot at the inferior division, with   brain tissue at risk and retained contrast material, without gross evidence of acute intracranial hemorrhage. Pt was deemed out of window for tenecteplase,  case was discussed with Dr. Colin Neuro IR and initially code Biplane was activated for mechanical thrombectomy but called off after pt was examined by Dr. Colin and had full resolution of symptoms. In the ED pt loaded with Plavix and ASA and admitted for  further management. RN called to evaluate pt for mld aphasia noted on neurochecks. Pt seen at bedside, with mild aphasia with NIHSS 1 which was 0 previously. Pt had no other neurological deficit at that time, noted team reached out to Dr. Colin, recommended permissive hypertension and pt's symptoms mild and did not warranty acute intervention at this time.    Impression: Left MCA stroke, due to cerebral embolism- left MCA M2 occlusion. Embolic stroke of undetermined source.     NEURO:   -Neurologically with conduction aphasia.   -Continue close monitoring for neurologic deterioration    - Stroke neuro checks q 2 hours- notify on call stroke attending/Neuro IR for any further neurological decline or increase in NIHSS.   - Permissive HTN, SBP goal 130-180mmHg avoiding rapid fluctuations and hypotension, IVF in place.   -ANTITHROMBOTIC THERAPY: ASA 81mg daily, Clopidogrel 75mg daily- duration pending findings of noted workup.   -titrate statin to LDL goal less than 70  -MRI Brain w/o, MRA Head w/o and Neck w/contrast  -Dysphagia screen: passed, advance diet as tolerated - advanced to regular, no si/sx of dysphagia per patient and daughter   -Physical therapy/OT/Speech eval/treatment.   -stroke education     -CARDIOVASCULAR:  TTE pending , cardiac monitoring w/ telemetry for now- asx bradycardia at times , continue close monitoring, further evaluation pending findings of noted workup                              -HEMATOLOGY: H/H without anemia , Platelets 233, patient should have all age and risk appropriate malignancy screenings with PCP or sooner if clinically suspected      DVT ppx: Heparin s.c [x] LMWH []     PULMONARY: CXR pending, hx pulmonary nodules need to be followed closely, protecting airway, saturating well     RENAL: BUN slightly elevated/Cr within range, IVF course for adequate hydration, monitor urine output, maintain adequate hydration       Na Goal:  135-145     May: N    ID: afebrile, no leukocytosis, monitor for si/sx of infection     OTHER:  condition and plan of care d/w patient and daughter, questions and concerns addressed.     DISPOSITION: Rehab or home depending on PT eval once stable and workup is complete      CORE MEASURES:        Admission NIHSS: 0      Tenecteplase : [] YES [x] NO      LDL/A1C: p/5.9     Depression Screen- if depression hx and/or present      Statin Therapy: as noted      Dysphagia Screen: [x] PASS [] FAIL     Smoking [] YES [x] NO      Afib [] YES [x] NO     Stroke Education [] YES [] NO pending completion    Obtain screening lower extremity venous ultrasound in patients who meet 1 or more of the following criteria as patient is high risk for DVT/PE on admission:   [] History of DVT/PE  []Hypercoagulable states (Factor V Leiden, Cancer, OCP, etc. )  []Prolonged immobility (hemiplegia/hemiparesis/post operative or any other extended immobilization)  [] Transferred from outside facility (Rehab or Long term care)  [] Age </= to 50     ASSESSMENT: 79yo M with hx of HTN, GERD presented to ED by [facility] stroke unit with aphasia and found to have distal LM2 occlusion.  Repeat imaging here showed occlusive M2 segment left MCA clot at the inferior division, with   brain tissue at risk and retained contrast material, without gross evidence of acute intracranial hemorrhage. Pt was deemed out of window for tenecteplase,  case was discussed with Dr. Colin Neuro IR and initially code Biplane was activated for mechanical thrombectomy but called off after pt was examined by Dr. Colin and had full resolution of symptoms. In the ED pt loaded with Plavix and ASA and admitted for  further management. RN called to evaluate pt for mld aphasia noted on neurochecks. Pt seen at bedside, with mild aphasia with NIHSS 1 which was 0 previously. Pt had no other neurological deficit at that time, noted team reached out to Dr. Colin, recommended permissive hypertension and pt's symptoms mild and did not warranty acute intervention at this time.    Impression: Left MCA stroke, due to cerebral embolism- left MCA M2 occlusion. Embolic stroke of undetermined source.     NEURO:   -Neurologically with conduction aphasia.   -Continue close monitoring for neurologic deterioration    - Stroke neuro checks q 2 hours- notify on call stroke attending/Neuro IR for any further neurological decline or increase in NIHSS.   - Permissive HTN, SBP goal 130-180mmHg avoiding rapid fluctuations and hypotension, IVF in place.   -ANTITHROMBOTIC THERAPY: ASA 81mg daily, Clopidogrel 75mg daily- duration pending findings of noted workup.   -titrate statin to LDL goal less than 70  - MRI As above  -Dysphagia screen: passed, advance diet as tolerated - advanced to regular, no si/sx of dysphagia per patient and daughter   -Physical therapy/OT/Speech eval/treatment.   -stroke education     -CARDIOVASCULAR:  Episode of Atrial tachycardia  - Consult Cardiology.  Will likely need ILR prior to DC.   - TTE pending , cardiac monitoring w/ telemetry for now- asx bradycardia at times , continue close monitoring, further evaluation pending findings of noted workup                              -HEMATOLOGY: H/H without anemia , Platelets 233, patient should have all age and risk appropriate malignancy screenings with PCP or sooner if clinically suspected      DVT ppx: Heparin s.c [x] LMWH []     PULMONARY: CXR pending, hx pulmonary nodules need to be followed closely, protecting airway, saturating well     RENAL: BUN slightly elevated/Cr within range, IVF course for adequate hydration, monitor urine output, maintain adequate hydration       Na Goal:  135-145     May: N    ID: afebrile, no leukocytosis, monitor for si/sx of infection     OTHER:  condition and plan of care d/w patient and daughter, questions and concerns addressed.     DISPOSITION: Rehab or home depending on PT eval once stable and workup is complete      CORE MEASURES:        Admission NIHSS: 0      Tenecteplase : [] YES [x] NO      LDL/A1C: p/5.9     Depression Screen- if depression hx and/or present      Statin Therapy: as noted      Dysphagia Screen: [x] PASS [] FAIL     Smoking [] YES [x] NO      Afib [] YES [x] NO     Stroke Education [] YES [] NO pending completion    Obtain screening lower extremity venous ultrasound in patients who meet 1 or more of the following criteria as patient is high risk for DVT/PE on admission:   [] History of DVT/PE  []Hypercoagulable states (Factor V Leiden, Cancer, OCP, etc. )  []Prolonged immobility (hemiplegia/hemiparesis/post operative or any other extended immobilization)  [] Transferred from outside facility (Rehab or Long term care)  [] Age </= to 50     ASSESSMENT: 79yo M with hx of HTN, GERD presented to ED by [facility] stroke unit with aphasia and found to have distal LM2 occlusion.  Repeat imaging here showed occlusive M2 segment left MCA clot at the inferior division, with   brain tissue at risk and retained contrast material, without gross evidence of acute intracranial hemorrhage. Pt was deemed out of window for tenecteplase,  case was discussed with Dr. Colin Neuro IR and initially code Biplane was activated for mechanical thrombectomy but called off after pt was examined by Dr. Colin and had full resolution of symptoms. In the ED pt loaded with Plavix and ASA and admitted for  further management. RN called to evaluate pt for mld aphasia noted on neurochecks. Pt seen at bedside, with mild aphasia with NIHSS 1 which was 0 previously. Pt had no other neurological deficit at that time, noted team reached out to Dr. Colin, recommended permissive hypertension and pt's symptoms mild and did not warranty acute intervention at this time.    Impression: Left MCA stroke, due to cerebral embolism- left MCA M2 occlusion. Embolic stroke of undetermined source.     NEURO:   -Neurologically with conduction aphasia.   -Continue close monitoring for neurologic deterioration    - Stroke neuro checks q 2 hours- notify on call stroke attending/Neuro IR for any further neurological decline or increase in NIHSS.   - Permissive HTN, SBP goal 130-180mmHg avoiding rapid fluctuations and hypotension, IVF in place.   -ANTITHROMBOTIC THERAPY: ASA 81mg daily, Clopidogrel 75mg daily- duration pending findings of noted workup.   -titrate statin to LDL goal less than 70  - MRI As above  -Dysphagia screen: passed, advance diet as tolerated - advanced to regular, no si/sx of dysphagia per patient and daughter   -Physical therapy/OT/Speech eval/treatment.   -stroke education     -CARDIOVASCULAR:  Episode of Atrial tachycardia  - Consult Cardiology.  Will likely need ILR prior to DC.   - TTE pending , cardiac monitoring w/ telemetry for now- asx bradycardia at times , continue close monitoring, further evaluation pending findings of noted workup                              -HEMATOLOGY: H/H without anemia , Platelets 233, patient should have all age and risk appropriate malignancy screenings with PCP or sooner if clinically suspected      DVT ppx: Heparin s.c [x] LMWH []     PULMONARY: CXR pending, hx pulmonary nodules need to be followed closely, protecting airway, saturating well     RENAL: BUN slightly elevated/Cr within range, IVF course for adequate hydration, monitor urine output, maintain adequate hydration       Na Goal:  135-145     May: N    Endo: Prediabetic with a1c 5.9    ID: afebrile, no leukocytosis, monitor for si/sx of infection     OTHER:  condition and plan of care d/w patient and daughter, questions and concerns addressed.     DISPOSITION: Rehab or home depending on PT eval once stable and workup is complete      CORE MEASURES:        Admission NIHSS: 0      Tenecteplase : [] YES [x] NO      LDL/A1C: p/5.9     Depression Screen- if depression hx and/or present      Statin Therapy: as noted      Dysphagia Screen: [x] PASS [] FAIL     Smoking [] YES [x] NO      Afib [] YES [x] NO     Stroke Education [] YES [] NO pending completion    Obtain screening lower extremity venous ultrasound in patients who meet 1 or more of the following criteria as patient is high risk for DVT/PE on admission:   [] History of DVT/PE  []Hypercoagulable states (Factor V Leiden, Cancer, OCP, etc. )  []Prolonged immobility (hemiplegia/hemiparesis/post operative or any other extended immobilization)  [] Transferred from outside facility (Rehab or Long term care)  [] Age </= to 50

## 2024-04-24 NOTE — PROVIDER CONTACT NOTE (OTHER) - SITUATION
[facility] Progress Note    Patient: Kiara Alcazar Date: 2019   : 1959 Attending: Silva Cazares MD         Admission date: 2019    Intensive Care Unit admission date:  19      ACTIVE PROBLEMS     -- Delirium-fluctuating   -- Hemorrhagic cystitis s/p cystoscopy on 19  -- Macrocytic anemia-received 1 U PRBC , stable today  -- EtOH  & Opiate abuse  -- Urinary retention. Tyson catheter placed by urology  -- Anxiety/Depression  -- Restless leg syndrome  -- Hx of COPD  -- Hx of DM          DISCUSSION/PLAN  Neuro:   Psychiatry consulted  This morning her confusion was behavioral.  Initially stated she didn't recall MRI or date.  When told I thought her memory was lacking because of her narcotics, she insisted this was not true and then stated the accurate date and events of MRI  On scheduled serqouel  On venlafaxine  Decision for LP was deferred as her mental status improved.  No meningeal signs.    MRI Lumber unremarkable     Pulmonary:  No acute issues        CV:  Continue metoprolol.     Renal:   No acute issues  Continue tyson      GI:   -- No acute issued  Regular diet     Heme:   -- Macrocytic anemia. Trending down Hb Normal TSH, folate, B12. No evidence of macroscopic GI bleed. FOBT positive wont explain hemoglobin drop  -- Hematology consult  -- Stable H&H this morning relatively stable, continue to monitor, no active bleeding      Endocrine:  -- continue the lantus and SSI     ID:   -Prior C Diff PCR positive but toxin negative. No prior or active infection. On PO vancomycin per Dr Cazares  -Started on antibiotics for possible UTI by  Dr Cazares  -Leukocytosis improving      Disposition: Transfer to medical floor     BEST PRACTICES:  - VTE prophylaxis: SCDs, UFH SC TID.  - SUP: Not indicated.  - LDA:Tyson catheter by urology for retention  - Nutrition: Oral  - Therapy/mobilization: activities as tolerated, PT/OT.           ================================================================    Subjective:  Pt admits to some LLE leg pain and ongoing back pain.  She knows her name and where she is, does not know year.  Hungry, ordering food off menu.      I/O last 3 completed shifts:  In: 840 [P.O.:840]  Out: 3125 [Urine:3125]  No intake/output data recorded.    Vital Last Value 24 Hour Range   Temperature 98.5 °F (36.9 °C) (01/28/19 2340) Temp  Min: 98.2 °F (36.8 °C)  Max: 99.1 °F (37.3 °C)   Pulse 86 (01/29/19 0600) Pulse  Min: 77  Max: 86   Respiratory 22 (01/29/19 0545) Resp  Min: 15  Max: 31   Non-Invasive  Blood Pressure 138/69 (01/29/19 0545) BP  Min: 100/56  Max: 138/69   Pulse Oximetry 97 % (01/29/19 0545) SpO2  Min: 93 %  Max: 98 %   Arterial   Blood Pressure   No Data Recorded                General appearance: alert, oriented to person and place and events.  Eating breakfast, sitting up in bed   Lungs: clear to auscultation bilaterally  Heart: regular rate and rhythm, S1, S2 normal, no murmur, click, rub or gallop  Abdomen: Soft, non-tender; bowel sounds normal; no masses, no hepatosplenomegaly  Extremities: extremities normal, atraumatic, no cyanosis or edema  Motor: moving all four extremities with weakness in the lower extremities      Pertinent Reviewed: Allergies, Medications, Labs and Physician and Nursing Notes       Dione Alexander MD  [facility]  Intensivist  [facility] f/u level III         Pt had run of A Tach, asymptomatic, VSS as charted, no acute distress noted.

## 2024-04-24 NOTE — H&P ADULT - NSHPPHYSICALEXAM_GEN_ALL_CORE
T(C): 36.7 (04-23-24 @ 23:58), Max: 36.7 (04-23-24 @ 23:58)  HR: 54 (04-23-24 @ 23:58) (51 - 54)  BP: 155/67 (04-23-24 @ 23:58) (153/72 - 167/67)  RR: 18 (04-23-24 @ 23:58) (15 - 18)  SpO2: 99% (04-23-24 @ 23:58) (98% - 99%)    GENERAL: patient appears well, no acute distress, appropriate, pleasant  EYES: sclera clear, no exudates  ENMT: oropharynx clear without erythema, no exudates, moist mucous membranes  NECK: supple, soft, no thyromegaly noted  LUNGS: good air entry bilaterally, clear to auscultation, symmetric breath sounds, no wheezing or rhonchi appreciated  HEART: soft S1/S2, regular rate and rhythm, no murmurs noted, no lower extremity edema  GASTROINTESTINAL: abdomen is soft, nontender, nondistended, normoactive bowel sounds, no palpable masses  INTEGUMENT: good skin turgor, warm skin, appears well perfused  MUSCULOSKELETAL: no clubbing or cyanosis, no obvious deformity  NEUROLOGIC: awake, alert, oriented x2-3, good muscle tone in 4 extremities, no obvious sensory deficits, mild aphasia   PSYCHIATRIC: mood is good  HEME/LYMPH: no palpable supraclavicular nodules, no obvious ecchymosis or petechiae

## 2024-04-24 NOTE — H&P ADULT - HISTORY OF PRESENT ILLNESS
79yo M with hx of HTN, GERD presented to ED by [facility] stroke unit with aphasia and found to have distal LM2 occlusion.  Repeat imaging here showed occlusive M2 segment left MCA clot at the inferior division, with   brain tissue at risk and retained contrast material, without gross evidence of acute intracranial hemorrhage. Pt was deemed out of window for TNK, but case was discussed with Dr. Colin Neuro IR and initially code Biplane was activated for mechanical thrombectomy but called after pt was examined by Dr. Colin and had full resolution of symptoms. In the ED pt loaded with Plavix and ASA and admitted further management. RN called to evaluate pt for mld aphasia noted on neurochecks. Pt seen at bedside, with mild aphasia with NIHSS 1 which was 0 previously. Pt had no other neurological decifit at this time, reach out to Dr. Colin, recommended permissive hypertension and pt's symptoms mild and does not warranty acute intervention at this time.            77yo M with hx of HTN, GERD presented to ED by [facility] stroke unit with aphasia and found to have distal LM2 occlusion.  Repeat imaging here showed occlusive M2 segment left MCA clot at the inferior division, with   brain tissue at risk and retained contrast material, without gross evidence of acute intracranial hemorrhage. Pt was deemed out of window for TNK, but case was discussed with Dr. Colin Neuro IR and initially code Biplane was activated for mechanical thrombectomy but called after pt was examined by Dr. Colin and had full resolution of symptoms. In the ED pt loaded with Plavix and ASA and admitted further management. RN called to evaluate pt for mld aphasia noted on neurochecks. Pt seen at bedside, with mild aphasia with NIHSS 1 which was 0 previously. Pt had no other neurological decifit at this time, reach out to Dr. Colin, recommended permissive hypertension and pt's symptoms mild and does not warranty acute intervention at this time. Pt has no other acute complaints at this time

## 2024-04-24 NOTE — PROVIDER CONTACT NOTE (CHANGE IN STATUS NOTIFICATION) - ASSESSMENT
NIH score is 1, pt aphasic . all other neurological assessments intact. PEERLA at 2mm.   VSS, pt bradycardic.

## 2024-04-24 NOTE — CONSULT NOTE ADULT - SUBJECTIVE AND OBJECTIVE BOX
INCOMPLETE NOTE    Preliminary note, official note pending attending review/signature.  Seen and examined by Stroke team attending/team, assessment/ plan as discussed with stroke team attending/team as noted.     [facility] Stroke Team    HPI:  77yo M with hx of HTN, GERD presented to ED by [facility] stroke unit with aphasia and found to have distal LM2 occlusion.  Repeat imaging here showed occlusive M2 segment left MCA clot at the inferior division, with   brain tissue at risk and retained contrast material, without gross evidence of acute intracranial hemorrhage. Pt was deemed out of window for TNK, but case was discussed with Dr. Colin Neuro IR and initially code Biplane was activated for mechanical thrombectomy but called off after pt was examined by Dr. Colin and had full resolution of symptoms. In the ED pt loaded with Plavix and ASA and admitted for  further management. RN called to evaluate pt for mld aphasia noted on neurochecks. Pt seen at bedside, with mild aphasia with NIHSS 1 which was 0 previously. Pt had no other neurological deficit at this time, reach out to Dr. Colin, recommended permissive hypertension and pt's symptoms mild and does not warranty acute intervention at this time. Pt has no other acute complaints at this time.         MEDICATIONS  (STANDING):  aspirin 325 milliGRAM(s) Oral daily  atorvastatin 80 milliGRAM(s) Oral at bedtime  clopidogrel Tablet 75 milliGRAM(s) Oral every 24 hours  heparin   Injectable 5000 Unit(s) SubCutaneous every 12 hours  pantoprazole    Tablet 40 milliGRAM(s) Oral before breakfast    Allergies: no known allergies    SOCIAL HISTORY:  no tobacco   drugs/alcohol use - unable to obtain 2/2 aphasia    ROS: 14 point ROS negative other than what is present in HPI or below    Vital Signs Last 24 Hrs  T(C): 36.6 (24 Apr 2024 06:20), Max: 36.7 (23 Apr 2024 23:58)  T(F): 97.9 (24 Apr 2024 06:20), Max: 98.1 (24 Apr 2024 02:00)  HR: 59 (24 Apr 2024 06:20) (51 - 61)  BP: 140/62 (24 Apr 2024 06:20) (134/57 - 167/67)  BP(mean): --  RR: 18 (24 Apr 2024 06:20) (15 - 18)  SpO2: 99% (24 Apr 2024 06:20) (98% - 99%)    Parameters below as of 24 Apr 2024 06:20  Patient On (Oxygen Delivery Method): room air    INCOMPLETE EXAM  Physical Exam:  General: No acute distress.   HEENT: Head normocephalic, atraumatic. Conjunctivae clear w/o exudates or hemorrhage. Sclera non-icteric. Nares are patent bilaterally. Mucous membranes pink and moist.  No tonsillar swelling or exudates.    Neck: Supple, no adenopathy. Trachea midline. No JVD.  Cardiac: Normal rate and rhythm. S1, S2 auscultated. No murmurs, gallops, or rubs.     Respiratory: Lung sounds clear in all fields. Chest wall symmetric, nontender.   Abdominal: Soft, nondistended, nontender. Bowel sounds normoactive x 4 quadrants. No masses, hepatomegaly, or splenomegaly.   Skin: Skin is warm, dry and intact without rashes or lesions. Appropriate color for ethnicity. Nailbeds pink with no cyanosis or clubbing.   Extremities: No edema, 2+ peripheral pulses in b/l upper and b/l lower extremities. Full range of motion in all joints.     Detailed Neurologic Exam:    Mental status: The patient is awake and alert and has normal attention span.  The patient is fully oriented in 3 spheres. The patient is oriented to current events. The patient is able to name objects, follow commands, repeat sentences.    Cranial nerves: Pupils equal and react symmetrically to light. There is no visual field deficit to confrontation. Extraocular motion is full with no nystagmus. There is no ptosis. Facial sensation is intact. Facial musculature is symmetric. Palate elevates symmetrically. Tongue is midline.    Motor: There is normal bulk and tone.  There is no tremor.  Strength is 5/5 in the right arm and leg.   Strength is 5/5 in the left arm and leg.    Sensation: Intact to light touch and pin in 4 extremities    Reflexes: 1-2+ throughout and plantar responses are flexor.    Cerebellar: There is no dysmetria on finger to nose testing.    Gait : deferred    [facility] SS:  DATE:  TIME:  1A: Level of consciousness (0-3):   1B: Questions (0-2):   1C: Commands (0-2):   2: Gaze (0-2):   3: Visual fields (0-3):   4: Facial palsy (0-3):   MOTOR:  5A: Left arm motor drift (0-4):   5B: Right arm motor drift (0-4):   6A: Left leg motor drift (0-4):   6B: Right leg motor drift (0-4):   7: Limb ataxia (0-2):   SENSORY:  8: Sensation (0-2):   SPEECH:  9: Language (0-3):   10: Dysarthria (0-2):   EXTINCTION:  11: Extinction/inattention (0-2):     TOTAL SCORE:     prehospital mRS =      LABS:                         14.0   6.54  )-----------( 233      ( 23 Apr 2024 19:40 )             41.1       04-23    139  |  101  |  21.9<H>  ----------------------------<  97  4.7   |  27.0  |  0.84    Ca    9.8      23 Apr 2024 19:40    TPro  6.7  /  Alb  4.4  /  TBili  0.4  /  DBili  x   /  AST  27  /  ALT  27  /  AlkPhos  45  04-23      PT/INR - ( 23 Apr 2024 19:40 )   PT: 11.4 sec;   INR: 1.03 ratio         PTT - ( 23 Apr 2024 19:40 )  PTT:30.5 sec    A1C: 5.9      Imaging reviewed by me:    NEURO IMAGING    CT Brain Stroke Protocol (04.23.24 @ 19:51)  IMPRESSION:  1.  Occlusive M2 segment left MCA clot at the inferior division, with   brain tissue at risk.  2.  Retained contrast material, without gross evidence of acute   intracranial hemorrhage.    MR Head No Cont (04.24.24 @ 05:54)  IMPRESSION:  ACUTE LEFT MCA TERRITORY INFARCT. NO ACUTE INTRACRANIAL HEMORRHAGE.     Preliminary note, official note pending attending review/signature.  Seen and examined by Stroke team attending/team, assessment/ plan as discussed with stroke team attending/team as noted.     [facility] Stroke Team    HPI:  77yo M with hx of HTN, GERD presented to ED by [facility] stroke unit with aphasia and found to have distal LM2 occlusion.  Repeat imaging here showed occlusive M2 segment left MCA clot at the inferior division, with   brain tissue at risk and retained contrast material, without gross evidence of acute intracranial hemorrhage. Pt was deemed out of window for TNK, but case was discussed with Dr. Colin Neuro IR and initially code Biplane was activated for mechanical thrombectomy but called off after pt was examined by Dr. Colin and had full resolution of symptoms. In the ED pt loaded with Plavix and ASA and admitted for  further management. RN called to evaluate pt for mld aphasia noted on neurochecks. Pt seen at bedside, with mild aphasia with NIHSS 1 which was 0 previously. Pt had no other neurological deficit at this time, reach out to Dr. Colin, recommended permissive hypertension and pt's symptoms mild and does not warranty acute intervention at this time. Pt has no other acute complaints at this time.         MEDICATIONS  (STANDING):  aspirin 325 milliGRAM(s) Oral daily  atorvastatin 80 milliGRAM(s) Oral at bedtime  clopidogrel Tablet 75 milliGRAM(s) Oral every 24 hours  heparin   Injectable 5000 Unit(s) SubCutaneous every 12 hours  pantoprazole    Tablet 40 milliGRAM(s) Oral before breakfast    Allergies: no known allergies    SOCIAL HISTORY:  no tobacco x 1989  drugs/alcohol use - denies     ROS: 14 point ROS negative other than what is present in HPI or below    Vital Signs Last 24 Hrs  T(C): 36.6 (2024 06:20), Max: 36.7 (2024 23:58)  T(F): 97.9 (2024 06:20), Max: 98.1 (2024 02:00)  HR: 59 (2024 06:20) (51 - 61)  BP: 140/62 (2024 06:20) (134/57 - 167/67)  BP(mean): --  RR: 18 (2024 06:20) (15 - 18)  SpO2: 99% (2024 06:20) (98% - 99%)    Parameters below as of 2024 06:20  Patient On (Oxygen Delivery Method): room air    Physical Exam:  General: No acute distress.   HEENT: Head normocephalic, atraumatic. Conjunctivae clear w/o exudates or hemorrhage. Sclera non-icteric. Nares are patent bilaterally. Mucous membranes pink and moist.  No tonsillar swelling or exudates.    Neck: Supple, no adenopathy. Trachea midline. No JVD.  Cardiac: Normal rate and rhythm. S1, S2 auscultated.   Respiratory: Lung sounds clear in all fields. Chest wall symmetric, nontender.   Abdominal: Soft, nondistended, nontender. Bowel sounds normoactive x 4 quadrants.    Skin: Skin is warm, dry and intact without rashes or lesions.    Extremities: No edema, .     Detailed Neurologic Exam: [facility]    Mental status: The patient is awake and alert and has normal attention span.  able to ID daughter. Word finding pauses, some impaired simple command following, breaks down on more complex commands, paraphasic errors, moderately impaired reading due to paraphasic errors. Speech fluent. perseverates, The patient is oriented to age/, april (27 or 26), wed, disoriented to year, repetition intact . IDs most objects- occasional paraphasias, The patient is aware of current neurological deficits , singing intact     Cranial nerves: subtle right facial palsy, Pupils equal and react symmetrically to light. There is no visual field deficit to confrontation. Extraocular motion is full with no nystagmus. There is no ptosis. Facial sensation is intact.   Palate elevates symmetrically. Tongue is midline.    Motor: There is normal bulk and tone.  There is no tremor.  Strength is 5/5 in the right arm and leg.   Strength is 5/5 in the left arm and leg.    Sensation: Intact to light touch and pin in 4 extremities    Cerebellar: There is no dysmetria on finger to nose testing.    Gait : deferred    [facility] SS:  DATE: 24  TIME: 1020  1A: Level of consciousness (0-3):   1B: Questions (0-2):   1C: Commands (0-2):   2: Gaze (0-2):   3: Visual fields (0-3):   4: Facial palsy (0-3): 1  MOTOR:  5A: Left arm motor drift (0-4):   5B: Right arm motor drift (0-4):   6A: Left leg motor drift (0-4):   6B: Right leg motor drift (0-4):   7: Limb ataxia (0-2):   SENSORY:  8: Sensation (0-2):   SPEECH:  9: Language (0-3): 1 (moderate)  10: Dysarthria (0-2):   EXTINCTION:  11: Extinction/inattention (0-2):     TOTAL SCORE: 2      prehospital mRS = 0      LABS:                         14.0   6.54  )-----------( 233      ( 2024 19:40 )             41.1           139  |  101  |  21.9<H>  ----------------------------<  97  4.7   |  27.0  |  0.84    Ca    9.8      2024 19:40    TPro  6.7  /  Alb  4.4  /  TBili  0.4  /  DBili  x   /  AST  27  /  ALT  27  /  AlkPhos  45        PT/INR - ( 2024 19:40 )   PT: 11.4 sec;   INR: 1.03 ratio         PTT - ( 2024 19:40 )  PTT:30.5 sec    A1C: 5.9      Imaging reviewed by me:    NEURO IMAGING    CT Brain Stroke Protocol/CT angiogram head/neck (24 @ 19:51)  IMPRESSION:  1.  Occlusive M2 segment left MCA clot at the inferior division, with   brain tissue at risk.  2.  Retained contrast material, without gross evidence of acute   intracranial hemorrhage.    MR Head No Cont (24 @ 05:54)  IMPRESSION:  ACUTE LEFT MCA TERRITORY INFARCT. NO ACUTE INTRACRANIAL HEMORRHAGE.         [facility] Stroke Team    HPI:  79yo M with hx of HTN, GERD presented to ED by [facility] stroke unit with aphasia and found to have distal LM2 occlusion.  Repeat imaging here showed occlusive M2 segment left MCA clot at the inferior division, with   brain tissue at risk and retained contrast material, without gross evidence of acute intracranial hemorrhage. Pt was deemed out of window for TNK, but case was discussed with Dr. Colin Neuro IR and initially code Biplane was activated for mechanical thrombectomy but called off after pt was examined by Dr. Colin and had full resolution of symptoms. In the ED pt loaded with Plavix and ASA and admitted for  further management. RN called to evaluate pt for mld aphasia noted on neurochecks. Pt seen at bedside, with mild aphasia with NIHSS 1 which was 0 previously. Pt had no other neurological deficit at this time, reach out to Dr. Colin, recommended permissive hypertension and pt's symptoms mild and does not warranty acute intervention at this time. Pt has no other acute complaints at this time.       Discussed with patient and chart reviewed--he does not have hx of lung cancer.  He is being followed by pulm for nodules--which are stable.       MEDICATIONS  (STANDING):  aspirin 325 milliGRAM(s) Oral daily  atorvastatin 80 milliGRAM(s) Oral at bedtime  clopidogrel Tablet 75 milliGRAM(s) Oral every 24 hours  heparin   Injectable 5000 Unit(s) SubCutaneous every 12 hours  pantoprazole    Tablet 40 milliGRAM(s) Oral before breakfast    Allergies: no known allergies    SOCIAL HISTORY:  no tobacco x 1989  drugs/alcohol use - denies     ROS: 14 point ROS negative other than what is present in HPI or below    Vital Signs Last 24 Hrs  T(C): 36.6 (2024 06:20), Max: 36.7 (2024 23:58)  T(F): 97.9 (2024 06:20), Max: 98.1 (2024 02:00)  HR: 59 (2024 06:20) (51 - 61)  BP: 140/62 (2024 06:20) (134/57 - 167/67)  BP(mean): --  RR: 18 (2024 06:20) (15 - 18)  SpO2: 99% (2024 06:20) (98% - 99%)    Parameters below as of 2024 06:20  Patient On (Oxygen Delivery Method): room air    Physical Exam:  General: No acute distress.   HEENT: Head normocephalic, atraumatic. Conjunctivae clear w/o exudates or hemorrhage. Sclera non-icteric. Nares are patent bilaterally. Mucous membranes pink and moist.  No tonsillar swelling or exudates.    Neck: Supple, no adenopathy. Trachea midline. No JVD.  Cardiac: Normal rate and rhythm. S1, S2 auscultated.   Respiratory: Lung sounds clear in all fields. Chest wall symmetric, nontender.   Abdominal: Soft, nondistended, nontender. Bowel sounds normoactive x 4 quadrants.    Skin: Skin is warm, dry and intact without rashes or lesions.    Extremities: No edema, .     Detailed Neurologic Exam: [facility]    Mental status: The patient is awake and alert and has normal attention span.  able to ID daughter. Word finding pauses, some impaired simple command following, breaks down on more complex commands, paraphasic errors, moderately impaired reading due to paraphasic errors. Speech fluent. perseverates, The patient is oriented to age/, april (27 or 26), wed, disoriented to year, repetition intact . IDs most objects- occasional paraphasias, The patient is aware of current neurological deficits , singing intact     Cranial nerves: subtle right facial palsy, Pupils equal and react symmetrically to light. There is no visual field deficit to confrontation. Extraocular motion is full with no nystagmus. There is no ptosis. Facial sensation is intact.   Palate elevates symmetrically. Tongue is midline.    Motor: There is normal bulk and tone.  There is no tremor.  Strength is 5/5 in the right arm and leg.   Strength is 5/5 in the left arm and leg.    Sensation: Intact to light touch and pin in 4 extremities    Cerebellar: There is no dysmetria on finger to nose testing.    Gait : deferred    [facility] SS:  DATE: 24  TIME: 1020  1A: Level of consciousness (0-3):   1B: Questions (0-2):   1C: Commands (0-2):   2: Gaze (0-2):   3: Visual fields (0-3):   4: Facial palsy (0-3): 1  MOTOR:  5A: Left arm motor drift (0-4):   5B: Right arm motor drift (0-4):   6A: Left leg motor drift (0-4):   6B: Right leg motor drift (0-4):   7: Limb ataxia (0-2):   SENSORY:  8: Sensation (0-2):   SPEECH:  9: Language (0-3): 1 (moderate)  10: Dysarthria (0-2):   EXTINCTION:  11: Extinction/inattention (0-2):     TOTAL SCORE: 2      prehospital mRS = 0      LABS:                         14.0   6.54  )-----------( 233      ( 2024 19:40 )             41.1           139  |  101  |  21.9<H>  ----------------------------<  97  4.7   |  27.0  |  0.84    Ca    9.8      2024 19:40    TPro  6.7  /  Alb  4.4  /  TBili  0.4  /  DBili  x   /  AST  27  /  ALT  27  /  AlkPhos  45        PT/INR - ( 2024 19:40 )   PT: 11.4 sec;   INR: 1.03 ratio         PTT - ( 2024 19:40 )  PTT:30.5 sec    A1C: 5.9      Imaging reviewed by me:    NEURO IMAGING    CT Brain Stroke Protocol/CT angiogram head/neck (24 @ 19:51)  IMPRESSION:  1.  Occlusive M2 segment left MCA clot at the inferior division, with   brain tissue at risk.  2.  Retained contrast material, without gross evidence of acute   intracranial hemorrhage.    MR Head No Cont (24 @ 05:54)  IMPRESSION:  ACUTE LEFT MCA TERRITORY INFARCT. NO ACUTE INTRACRANIAL HEMORRHAGE.

## 2024-04-24 NOTE — PHYSICAL THERAPY INITIAL EVALUATION ADULT - ADDITIONAL COMMENTS
pt lives with his daughter and her family in a 2-story house with 1 step to enter. pt resides on first floor, so he does not have to negotiate steps at home. daughter and family upstairs. no DME.

## 2024-04-24 NOTE — PROVIDER CONTACT NOTE (OTHER) - REASON
Alerted by monitor tech that patient had run of A Tach, asymptomatic, pt resting in bed with daughter at bedside

## 2024-04-24 NOTE — H&P ADULT - ASSESSMENT
77yo M with hx of HTN, GERD presented to ED by [facility] stroke unit with aphasia and found to have distal LM2 occlusion.     Left MCA M2 segment occulusion   Aphasia  -Out of window for TNK, pt LKW at 1pm   -Code Biplan was initially active then cancelled due to symptom resolution    -Evaluated by Neuro IR Dr. Colin no acute intervention in the setting of NIHSS 0  -Received ASA, PLavix load in the ED  -after pt was admitted on re-evaluation of his neurochecks found to NIHSS 1 due to mild aphasia, spoke with Dr. Colin and symptoms still mild to warrant acute intervention, recommended permissive hypertension  -admit to stepdown, neurochecks and vital Q2hrs   -cont with Plavix, ASA and high intensity atorvastatin   -MRI head, TTE ordered   -permissive HTN with goal -180  -SCD/Heparin for VTE ppx  -pureed diet   -Check lipid panel, A1c   -Neurology consulted     HTN  -hold Losartan 100mg Po daily   -allow permissive HTN in the setting of acute CVA    GERD  -cont Protonix 40mg po daily     DVT ppx  -SCD/heparin subQ           Time-based billing (NON-critical care).     80 minutes spent on total encounter. The necessity of the time spent during the encounter on this date of service was due to:   chart review, patient evaluation, independent history taking, documentation, coordination of care and discussion with patient, nurse, consultant.

## 2024-04-24 NOTE — CONSULT NOTE ADULT - SUBJECTIVE AND OBJECTIVE BOX
CHIEF COMPLAINT: aphasia     HPI: Patient is a  78y Male with HTN, former smoker, lung nodule, GERD here with aphasia. Found to have M2 segment occlusion of left MCA and out of window for TNK. Was planned for thrombectomy but had resolution of symptoms and cancelled. Given ASA/plavix.     PAST MEDICAL & SURGICAL HISTORY:  HTN (hypertension)      GERD (gastroesophageal reflux disease)          MEDICATIONS:  MEDICATIONS  (STANDING):  aspirin  chewable 81 milliGRAM(s) Oral daily  atorvastatin 80 milliGRAM(s) Oral at bedtime  clopidogrel Tablet 75 milliGRAM(s) Oral every 24 hours  heparin   Injectable 5000 Unit(s) SubCutaneous every 12 hours  pantoprazole    Tablet 40 milliGRAM(s) Oral before breakfast  sodium chloride 0.9%. 1000 milliLiter(s) (50 mL/Hr) IV Continuous <Continuous>    MEDICATIONS  (PRN):    aspirin  chewable 81 milliGRAM(s) Oral daily  atorvastatin 80 milliGRAM(s) Oral at bedtime  clopidogrel Tablet 75 milliGRAM(s) Oral every 24 hours  heparin   Injectable 5000 Unit(s) SubCutaneous every 12 hours  pantoprazole    Tablet 40 milliGRAM(s) Oral before breakfast  sodium chloride 0.9%. 1000 milliLiter(s) IV Continuous <Continuous>      FAMILY HISTORY:  FAMILY HISTORY:      SOCIAL HISTORY: no EtOH, drugs or tobacco    ROS: GI negative, All others negative    PHYSCIAL EXAM:  Vital Signs Last 24 Hrs  T(C): 36.7 (24 Apr 2024 13:32), Max: 36.8 (24 Apr 2024 07:42)  T(F): 98.1 (24 Apr 2024 13:32), Max: 98.2 (24 Apr 2024 07:42)  HR: 55 (24 Apr 2024 13:32) (51 - 61)  BP: 169/85 (24 Apr 2024 13:32) (134/57 - 170/56)  BP(mean): --  RR: 17 (24 Apr 2024 13:32) (15 - 18)  SpO2: 98% (24 Apr 2024 13:32) (98% - 99%)    Parameters below as of 24 Apr 2024 13:32  Patient On (Oxygen Delivery Method): room air      I&O's Summary    GEN: NAD  HEENT: MMM, sclera anicteric  RESP: CTA bilaterally  CVS: S1S2, RRR, no JVD, no M/R/G  GI: Soft, NT, ND, BS+  EXT: no C/C/E  NEURO: AAOX3  PSYCH: Normal affect      LABS:                        14.0   6.54  )-----------( 233      ( 23 Apr 2024 19:40 )             41.1     04-23    139  |  101  |  21.9<H>  ----------------------------<  97  4.7   |  27.0  |  0.84    Ca    9.8      23 Apr 2024 19:40    TPro  6.7  /  Alb  4.4  /  TBili  0.4  /  DBili  x   /  AST  27  /  ALT  27  /  AlkPhos  45  04-23        PT/INR - ( 23 Apr 2024 19:40 )   PT: 11.4 sec;   INR: 1.03 ratio         PTT - ( 23 Apr 2024 19:40 )  PTT:30.5 sec      ECG: SB, no ST-T abnormalities    Assessment:    Patient is a  78y Male with HTN, former smoker, lung nodule, GERD here with aphasia due to CVA from left distal M2 occlusion. Out of TNK window and symptoms resolved prior to planned thrombectomy.  No hemorrhage on CT.   -    Plan: (TO BE SEEN)  1. ASA/plavix and high dose statin  2. Follow up echo   3. BP management per neuro  4.  5.     Aurelio Mayo MD CHIEF COMPLAINT: aphasia     HPI: Patient is a  78y Male with HTN, former smoker, lung nodule, GERD here with aphasia. Found to have M2 segment occlusion of left MCA and out of window for TNK. Was planned for thrombectomy but had symptom improvement and cancelled. Given ASA/plavix. Still struggles with word findings and mild aphasia. States was feeling well prior to aphasic symptoms. No prior exertional CP/SOB, no palps and no syncope. No PND/orthopnea/edema    PAST MEDICAL & SURGICAL HISTORY:  HTN (hypertension)      GERD (gastroesophageal reflux disease)          MEDICATIONS:  MEDICATIONS  (STANDING):  aspirin  chewable 81 milliGRAM(s) Oral daily  atorvastatin 80 milliGRAM(s) Oral at bedtime  clopidogrel Tablet 75 milliGRAM(s) Oral every 24 hours  heparin   Injectable 5000 Unit(s) SubCutaneous every 12 hours  pantoprazole    Tablet 40 milliGRAM(s) Oral before breakfast  sodium chloride 0.9%. 1000 milliLiter(s) (50 mL/Hr) IV Continuous <Continuous>    MEDICATIONS  (PRN):    aspirin  chewable 81 milliGRAM(s) Oral daily  atorvastatin 80 milliGRAM(s) Oral at bedtime  clopidogrel Tablet 75 milliGRAM(s) Oral every 24 hours  heparin   Injectable 5000 Unit(s) SubCutaneous every 12 hours  pantoprazole    Tablet 40 milliGRAM(s) Oral before breakfast  sodium chloride 0.9%. 1000 milliLiter(s) IV Continuous <Continuous>      FAMILY HISTORY:  FAMILY HISTORY:      SOCIAL HISTORY: no EtOH, drugs or tobacco    ROS: GI negative, All others negative    PHYSCIAL EXAM:  Vital Signs Last 24 Hrs  T(C): 36.7 (24 Apr 2024 13:32), Max: 36.8 (24 Apr 2024 07:42)  T(F): 98.1 (24 Apr 2024 13:32), Max: 98.2 (24 Apr 2024 07:42)  HR: 55 (24 Apr 2024 13:32) (51 - 61)  BP: 169/85 (24 Apr 2024 13:32) (134/57 - 170/56)  BP(mean): --  RR: 17 (24 Apr 2024 13:32) (15 - 18)  SpO2: 98% (24 Apr 2024 13:32) (98% - 99%)    Parameters below as of 24 Apr 2024 13:32  Patient On (Oxygen Delivery Method): room air      I&O's Summary    GEN: NAD  HEENT: MMM, sclera anicteric  RESP: CTA bilaterally  CVS: S1S2, RRR, no JVD, no M/R/G  GI: Soft, NT, ND, BS+  EXT: no C/C/E  NEURO: AAOX3, mild aphasia  PSYCH: Normal affect      LABS:                        14.0   6.54  )-----------( 233      ( 23 Apr 2024 19:40 )             41.1     04-23    139  |  101  |  21.9<H>  ----------------------------<  97  4.7   |  27.0  |  0.84    Ca    9.8      23 Apr 2024 19:40    TPro  6.7  /  Alb  4.4  /  TBili  0.4  /  DBili  x   /  AST  27  /  ALT  27  /  AlkPhos  45  04-23        PT/INR - ( 23 Apr 2024 19:40 )   PT: 11.4 sec;   INR: 1.03 ratio         PTT - ( 23 Apr 2024 19:40 )  PTT:30.5 sec      ECG: SB, no ST-T abnormalities    Assessment:    Patient is a  78y Male with HTN, former smoker, lung nodule, GERD here with aphasia due to CVA from left distal M2 occlusion. Out of TNK window and symptoms improved prior to planned thrombectomy.  No hemorrhage on CT.   -ECG unremarkable  -Symptoms improving but remains with mild aphasia and struggles with word finding  -Brief run SVT on tele with mild aberrancy. Lasted < 10 seconds, ? AT, cannot exclude AF.     Plan:   1. ASA/plavix and high dose statin  2. Follow up echo   3. BP management per neuro  4. EP evaluation for ILR prior to discharge to evaluate for occult AF and given brief SVT on monitor  5. Discussed with Neuro attending Dr Scott.  No indication for SANAZ at this time   6. thank you!     Aurelio Mayo MD

## 2024-04-25 ENCOUNTER — RESULT REVIEW (OUTPATIENT)
Age: 79
End: 2024-04-25

## 2024-04-25 ENCOUNTER — TRANSCRIPTION ENCOUNTER (OUTPATIENT)
Age: 79
End: 2024-04-25

## 2024-04-25 VITALS
HEART RATE: 58 BPM | TEMPERATURE: 98 F | OXYGEN SATURATION: 98 % | DIASTOLIC BLOOD PRESSURE: 66 MMHG | SYSTOLIC BLOOD PRESSURE: 165 MMHG

## 2024-04-25 DIAGNOSIS — D64.9 ANEMIA, UNSPECIFIED: ICD-10-CM

## 2024-04-25 LAB
ANION GAP SERPL CALC-SCNC: 18 MMOL/L — HIGH (ref 5–17)
BUN SERPL-MCNC: 17.5 MG/DL — SIGNIFICANT CHANGE UP (ref 8–20)
CALCIUM SERPL-MCNC: 9.1 MG/DL — SIGNIFICANT CHANGE UP (ref 8.4–10.5)
CHLORIDE SERPL-SCNC: 103 MMOL/L — SIGNIFICANT CHANGE UP (ref 96–108)
CHOLEST SERPL-MCNC: 95 MG/DL — SIGNIFICANT CHANGE UP
CO2 SERPL-SCNC: 21 MMOL/L — LOW (ref 22–29)
CREAT SERPL-MCNC: 0.79 MG/DL — SIGNIFICANT CHANGE UP (ref 0.5–1.3)
EGFR: 91 ML/MIN/1.73M2 — SIGNIFICANT CHANGE UP
GLUCOSE SERPL-MCNC: 107 MG/DL — HIGH (ref 70–99)
HCT VFR BLD CALC: 37.2 % — LOW (ref 39–50)
HDLC SERPL-MCNC: 28 MG/DL — LOW
HGB BLD-MCNC: 12.8 G/DL — LOW (ref 13–17)
LIPID PNL WITH DIRECT LDL SERPL: 19 MG/DL — SIGNIFICANT CHANGE UP
MCHC RBC-ENTMCNC: 31.1 PG — SIGNIFICANT CHANGE UP (ref 27–34)
MCHC RBC-ENTMCNC: 34.4 GM/DL — SIGNIFICANT CHANGE UP (ref 32–36)
MCV RBC AUTO: 90.3 FL — SIGNIFICANT CHANGE UP (ref 80–100)
NON HDL CHOLESTEROL: 67 MG/DL — SIGNIFICANT CHANGE UP
PLATELET # BLD AUTO: 202 K/UL — SIGNIFICANT CHANGE UP (ref 150–400)
POTASSIUM SERPL-MCNC: 4 MMOL/L — SIGNIFICANT CHANGE UP (ref 3.5–5.3)
POTASSIUM SERPL-SCNC: 4 MMOL/L — SIGNIFICANT CHANGE UP (ref 3.5–5.3)
RBC # BLD: 4.12 M/UL — LOW (ref 4.2–5.8)
RBC # FLD: 13.6 % — SIGNIFICANT CHANGE UP (ref 10.3–14.5)
SODIUM SERPL-SCNC: 142 MMOL/L — SIGNIFICANT CHANGE UP (ref 135–145)
TRIGL SERPL-MCNC: 238 MG/DL — HIGH
WBC # BLD: 6.52 K/UL — SIGNIFICANT CHANGE UP (ref 3.8–10.5)
WBC # FLD AUTO: 6.52 K/UL — SIGNIFICANT CHANGE UP (ref 3.8–10.5)

## 2024-04-25 PROCEDURE — 93970 EXTREMITY STUDY: CPT

## 2024-04-25 PROCEDURE — 99232 SBSQ HOSP IP/OBS MODERATE 35: CPT

## 2024-04-25 PROCEDURE — 99233 SBSQ HOSP IP/OBS HIGH 50: CPT

## 2024-04-25 PROCEDURE — 70498 CT ANGIOGRAPHY NECK: CPT | Mod: MC

## 2024-04-25 PROCEDURE — 83036 HEMOGLOBIN GLYCOSYLATED A1C: CPT

## 2024-04-25 PROCEDURE — 71045 X-RAY EXAM CHEST 1 VIEW: CPT

## 2024-04-25 PROCEDURE — 70496 CT ANGIOGRAPHY HEAD: CPT | Mod: MC

## 2024-04-25 PROCEDURE — 93970 EXTREMITY STUDY: CPT | Mod: 26

## 2024-04-25 PROCEDURE — 80061 LIPID PANEL: CPT

## 2024-04-25 PROCEDURE — 93005 ELECTROCARDIOGRAM TRACING: CPT

## 2024-04-25 PROCEDURE — 93306 TTE W/DOPPLER COMPLETE: CPT | Mod: 26

## 2024-04-25 PROCEDURE — 82962 GLUCOSE BLOOD TEST: CPT

## 2024-04-25 PROCEDURE — 36415 COLL VENOUS BLD VENIPUNCTURE: CPT

## 2024-04-25 PROCEDURE — 80053 COMPREHEN METABOLIC PANEL: CPT

## 2024-04-25 PROCEDURE — 97163 PT EVAL HIGH COMPLEX 45 MIN: CPT

## 2024-04-25 PROCEDURE — 93306 TTE W/DOPPLER COMPLETE: CPT

## 2024-04-25 PROCEDURE — 85025 COMPLETE CBC W/AUTO DIFF WBC: CPT

## 2024-04-25 PROCEDURE — 84484 ASSAY OF TROPONIN QUANT: CPT

## 2024-04-25 PROCEDURE — 99291 CRITICAL CARE FIRST HOUR: CPT | Mod: 25

## 2024-04-25 PROCEDURE — 85027 COMPLETE CBC AUTOMATED: CPT

## 2024-04-25 PROCEDURE — 70450 CT HEAD/BRAIN W/O DYE: CPT | Mod: MC

## 2024-04-25 PROCEDURE — 0042T: CPT | Mod: MC

## 2024-04-25 PROCEDURE — 85610 PROTHROMBIN TIME: CPT

## 2024-04-25 PROCEDURE — 33285 INSJ SUBQ CAR RHYTHM MNTR: CPT

## 2024-04-25 PROCEDURE — C1764: CPT

## 2024-04-25 PROCEDURE — 70551 MRI BRAIN STEM W/O DYE: CPT | Mod: MC

## 2024-04-25 PROCEDURE — 85730 THROMBOPLASTIN TIME PARTIAL: CPT

## 2024-04-25 PROCEDURE — 80048 BASIC METABOLIC PNL TOTAL CA: CPT

## 2024-04-25 RX ORDER — CEFAZOLIN SODIUM 1 G
2000 VIAL (EA) INJECTION ONCE
Refills: 0 | Status: DISCONTINUED | OUTPATIENT
Start: 2024-04-25 | End: 2024-04-25

## 2024-04-25 RX ORDER — CEPHALEXIN 500 MG
500 CAPSULE ORAL ONCE
Refills: 0 | Status: COMPLETED | OUTPATIENT
Start: 2024-04-25 | End: 2024-04-25

## 2024-04-25 RX ORDER — ASPIRIN/CALCIUM CARB/MAGNESIUM 324 MG
1 TABLET ORAL
Qty: 60 | Refills: 0
Start: 2024-04-25 | End: 2024-06-23

## 2024-04-25 RX ORDER — ASPIRIN/CALCIUM CARB/MAGNESIUM 324 MG
1 TABLET ORAL
Qty: 40 | Refills: 0
Start: 2024-04-25 | End: 2024-06-03

## 2024-04-25 RX ORDER — HYDROCHLOROTHIAZIDE 25 MG
1 TABLET ORAL
Refills: 0 | DISCHARGE

## 2024-04-25 RX ADMIN — Medication 81 MILLIGRAM(S): at 11:37

## 2024-04-25 RX ADMIN — Medication 500 MILLIGRAM(S): at 07:59

## 2024-04-25 RX ADMIN — HEPARIN SODIUM 5000 UNIT(S): 5000 INJECTION INTRAVENOUS; SUBCUTANEOUS at 05:22

## 2024-04-25 RX ADMIN — SODIUM CHLORIDE 50 MILLILITER(S): 9 INJECTION INTRAMUSCULAR; INTRAVENOUS; SUBCUTANEOUS at 09:30

## 2024-04-25 RX ADMIN — PANTOPRAZOLE SODIUM 40 MILLIGRAM(S): 20 TABLET, DELAYED RELEASE ORAL at 05:22

## 2024-04-25 NOTE — DISCHARGE NOTE PROVIDER - NSDCQMSTATINACONTRAOTHER_GEN_A_CORE_FT
etiology of stroke is nonatherosclerotic and patient's LDL is 19 and was not on a statin regimen before coming to the hospital.

## 2024-04-25 NOTE — DISCHARGE NOTE NURSING/CASE MANAGEMENT/SOCIAL WORK - PATIENT PORTAL LINK FT
You can access the FollowMyHealth Patient Portal offered by [facility] by registering at the following website: [email or web address]. By joining [employer]’s FollowMyHealth portal, you will also be able to view your health information using other applications (apps) compatible with our system.

## 2024-04-25 NOTE — PROGRESS NOTE ADULT - ASSESSMENT
INCOMPLETE NOTE    ASSESSMENT: 77yo M with hx of HTN, GERD presented to ED by [facility] stroke unit with aphasia and found to have distal LM2 occlusion. Repeat imaging here showed occlusive M2 segment left MCA clot at the inferior division, with   brain tissue at risk and retained contrast material, without gross evidence of acute intracranial hemorrhage. Pt was deemed out of window for tenecteplase,  case was discussed with Dr. Colin Neuro IR and initially code Biplane was activated for mechanical thrombectomy but called off after pt was examined by Dr. Colin and had full resolution of symptoms. In the ED pt loaded with Plavix and ASA and admitted for  further management. RN called to evaluate pt for mld aphasia noted on neurochecks. Pt seen at bedside, with mild aphasia with NIHSS 1 which was 0 previously. Pt had no other neurological deficit at that time, noted team reached out to Dr. Colin, recommended permissive hypertension and pt's symptoms mild and did not warranty acute intervention at this time.    Impression: Left MCA stroke, due to cerebral embolism- left MCA M2 occlusion. Embolic stroke of undetermined source.     NEURO:   -Neurologically with conduction aphasia.   -Continue close monitoring for neurologic deterioration    - Stroke neuro checks q 2 hours- notify on call stroke attending/Neuro IR for any further neurological decline or increase in NIHSS.   - Permissive HTN, SBP goal 130-180mmHg avoiding rapid fluctuations and hypotension, IVF in place.   -ANTITHROMBOTIC THERAPY: ASA 81mg daily, Clopidogrel 75mg daily- duration pending findings of noted workup.   -titrate statin to LDL goal less than 70  - MRI As above  -Dysphagia screen: passed, advance diet as tolerated - advanced to regular, no si/sx of dysphagia per patient and daughter   -Physical therapy/OT/Speech eval/treatment.   -stroke education     -CARDIOVASCULAR:  Episode of Atrial tachycardia  - Consult Cardiology.  Will likely need ILR prior to DC.   - TTE pending , cardiac monitoring w/ telemetry for now- asx bradycardia at times , continue close monitoring, further evaluation pending findings of noted workup                              -HEMATOLOGY: H/H without anemia , Platelets 233, patient should have all age and risk appropriate malignancy screenings with PCP or sooner if clinically suspected      DVT ppx: Heparin s.c [x] LMWH []     PULMONARY: CXR pending, hx pulmonary nodules need to be followed closely, protecting airway, saturating well     RENAL: BUN slightly elevated/Cr within range, IVF course for adequate hydration, monitor urine output, maintain adequate hydration       Na Goal:  135-145     May: N    Endo: Prediabetic with a1c 5.9    ID: afebrile, no leukocytosis, monitor for si/sx of infection     OTHER: condition and plan of care d/w patient and daughter, questions and concerns addressed.     DISPOSITION: Rehab or home depending on PT eval once stable and workup is complete      CORE MEASURES:        Admission NIHSS: 0      Tenecteplase : [] YES [x] NO      LDL/A1C: p/5.9     Depression Screen- if depression hx and/or present      Statin Therapy: as noted      Dysphagia Screen: [x] PASS [] FAIL     Smoking [] YES [x] NO      Afib [] YES [x] NO     Stroke Education [] YES [] NO pending completion    Obtain screening lower extremity venous ultrasound in patients who meet 1 or more of the following criteria as patient is high risk for DVT/PE on admission:   [] History of DVT/PE  []Hypercoagulable states (Factor V Leiden, Cancer, OCP, etc. )  []Prolonged immobility (hemiplegia/hemiparesis/post operative or any other extended immobilization)  [] Transferred from outside facility (Rehab or Long term care)  [] Age </= to 50 ASSESSMENT: 79yo M with hx of HTN, GERD presented to ED by [facility] stroke unit with aphasia and found to have distal LM2 occlusion. Repeat imaging at [facility] showed occlusive M2 segment left MCA clot without gross evidence of acute intracranial hemorrhage. Pt was deemed out of window for tenecteplase, case was discussed with Dr. Colin Neuro IR, code Biplane was canceled after pt has full resolution of sx on exam. In the ED pt loaded with Plavix and ASA and admitted to the stroke team for further workup and management.     Impression: Left MCA stroke, due to cerebral embolism - left MCA M2 occlusion. Embolic stroke of undetermined source pending workup.    NEURO:   -Neurologically with conduction aphasia, inserting words but improved from yesterday  -Continue close monitoring for neurologic deterioration    -Stroke neuro checks q 2 hours - notify on call stroke attending/Neuro IR for any further neurological decline or increase in NIHSS.   -Permissive HTN, SBP goal 130-180mmHg avoiding rapid fluctuations and hypotension  -ANTITHROMBOTIC THERAPY: ASA 81mg daily, Clopidogrel 75mg daily - duration pending findings of noted workup.   -MRI as noted above  -Dysphagia screen: passed, tolerating regular diet; patient will need outpatient speech therapy  -Physical therapy/OT/Speech eval/treatment.   -Stroke education     -CARDIOVASCULAR:  Brief episode of SVT <10 seconds on telemetry this morning, again this afternoon - asymptomatic  --Cardiology consulted - ILR implant to monitor for a fib with Electrophysiologist Dr. Pantoja this morning, doing well, denies complaints at this time; resume home meds and pain control with PO analgesia PRN, will follow up outpatient in 1-2 weeks, per cardiology recommendations  --TTE pending, no need for SANAZ per cardio; cardiac monitoring w/ telemetry for now- asx bradycardia at times, continue close monitoring, further evaluation pending findings of noted workup                              -HEMATOLOGY: H/H with new mild anemia (12.8/37.2) - will continue to monitor, Platelets 202, patient should have all age and risk appropriate malignancy screenings with PCP or sooner if clinically suspected  -LE duplex pending read     DVT ppx: Heparin s.c [x] LMWH []     PULMONARY: CXR pending read, hx pulmonary nodules need to be followed closely, protecting airway, saturating well     RENAL: BUN/Cr within range, monitor urine output, maintain adequate hydration       Na Goal:  135-145     May: N    Endo: Prediabetic with a1c 5.9    ID: afebrile, no leukocytosis, monitor for si/sx of infection     OTHER: condition and plan of care d/w patient and daughter, questions and concerns addressed.     DISPOSITION: per PT - home, no skilled PT needs    CORE MEASURES:        Admission NIHSS: 0      Tenecteplase : [] YES [x] NO      LDL/A1C: 19/5.9     Depression Screen - if depression hx and/or present      Statin Therapy: lipitor 80mg      Dysphagia Screen: [x] PASS [] FAIL     Smoking [] YES [x] NO      Afib [] YES [x] NO     Stroke Education [] YES [] NO pending completion    Obtain screening lower extremity venous ultrasound in patients who meet 1 or more of the following criteria as patient is high risk for DVT/PE on admission:   [] History of DVT/PE  []Hypercoagulable states (Factor V Leiden, Cancer, OCP, etc. )  []Prolonged immobility (hemiplegia/hemiparesis/post operative or any other extended immobilization)  [] Transferred from outside facility (Rehab or Long term care)  [] Age </= to 50

## 2024-04-25 NOTE — PROGRESS NOTE ADULT - SUBJECTIVE AND OBJECTIVE BOX
78y Male with HTN, former smoker, lung nodule, GERD who p/w with aphasia due to CVA from left distal M2 occlusion. Pt was outside of TNK window and symptoms improved prior to planned thrombectomy.  No hemorrhage on CT. Telemetry reviewed with brief run SVT < 10 seconds.  Pt now presents for elective MDT ILR implant with Dr Pantoja to monitor for AF.  Dr Mayo's note from reviewed and there have been no interval changes.  Keflex 500mg x 1 ordered for procedural prophylaxis.

## 2024-04-25 NOTE — PROGRESS NOTE ADULT - SUBJECTIVE AND OBJECTIVE BOX
Procedure: MDT Loop Recorder implant    Electrophysiologist: Dr Pantoja    Pt doing well s/p loop recorder implant. Denies complaint.     Incision: Dermabond C/D/I; no bleeding, hematoma, erythema, exudate or edema    Plan:   Resume PO intake.   Ambulate w/ assist, then progress as tolerated.     Pain control with PO analgesia PRN.   Resume home medications.   D/C home once all criteria met, with outpt f/up in 1-2 weeks.

## 2024-04-25 NOTE — DISCHARGE NOTE PROVIDER - PROVIDER TOKENS
PROVIDER:[TOKEN:[[id number]],FOLLOWUP:[1 week]],PROVIDER:[TOKEN:[[id number]],FOLLOWUP:[1 week]],PROVIDER:[TOKEN:[[id number]],FOLLOWUP:[1 week]],PROVIDER:[TOKEN:[[id number]],FOLLOWUP:[1 week]]

## 2024-04-25 NOTE — DISCHARGE NOTE PROVIDER - NSDCCPTREATMENT_GEN_ALL_CORE_FT
PRINCIPAL PROCEDURE  Procedure: Insertion, loop recorder  Findings and Treatment: please follow instructions provided to you by the electrophysiologist

## 2024-04-25 NOTE — PROGRESS NOTE ADULT - SUBJECTIVE AND OBJECTIVE BOX
[facility]  [id number]      Chief Complaint:  CVA/SVT    Interval History:  Patient feeling much better, speech improved.  Still some word finding.  Denies CP, SOB, palps.    Tele:  SR, no events      aspirin  chewable 81 milliGRAM(s) Oral daily  atorvastatin 80 milliGRAM(s) Oral at bedtime  clopidogrel Tablet 75 milliGRAM(s) Oral every 24 hours  heparin   Injectable 5000 Unit(s) SubCutaneous every 12 hours  pantoprazole    Tablet 40 milliGRAM(s) Oral before breakfast  sodium chloride 0.9%. 1000 milliLiter(s) IV Continuous <Continuous>          Physical Exam:  T(C): 36.6 (04-25-24 @ 10:00), Max: 36.9 (04-25-24 @ 08:00)  HR: 60 (04-25-24 @ 10:00) (50 - 71)  BP: 122/84 (04-25-24 @ 09:00) (122/84 - 174/77)  RR: 18 (04-25-24 @ 10:00) (16 - 19)  SpO2: 99% (04-25-24 @ 10:00) (97% - 100%)  General: Comfortable in NAD  Neck: No JVD  CVS: nl s1s2, no s3  Pulm: CTA b/l  Abd: soft, non-tender  Ext: No c/c/e  Neuro A&O x3  Psych: Normal affect      Labs:   25 Apr 2024 05:20    142    |  103    |  17.5   ----------------------------<  107    4.0     |  21.0   |  0.79     Ca    9.1        25 Apr 2024 05:20    TPro  6.7    /  Alb  4.4    /  TBili  0.4    /  DBili  x      /  AST  27     /  ALT  27     /  AlkPhos  45     23 Apr 2024 19:40                          12.8   6.52  )-----------( 202      ( 25 Apr 2024 05:20 )             37.2     PT/INR - ( 23 Apr 2024 19:40 )   PT: 11.4 sec;   INR: 1.03 ratio         PTT - ( 23 Apr 2024 19:40 )  PTT:30.5 sec      CTA Head/Neck:  1.  Occlusive M2 segment left MCA clot at the inferior division, with   brain tissue at risk.  2.  Retained contrast material, without gross evidence of acute   intracranial hemorrhage.  3. On the left, there is calcified plaque at the ICA origin with 10-20%   stenosis. On the right, there is minimal plaque without significant   narrowing at the carotid bifurcation. T    Head MRI:  ACUTE LEFT MCA TERRITORY INFARCT. NO ACUTE INTRACRANIAL HEMORRHAGE.        Assessment:    Patient is a  78y Male with HTN, former smoker, lung nodule, GERD here with aphasia due to CVA from left distal M2 occlusion. Out of TNK window and symptoms improved prior to planned thrombectomy.  No hemorrhage on CT.   -ECG unremarkable.  -Symptoms improving but remains with mild aphasia and struggles with word finding.  -Brief run SVT on tele with mild aberrancy. Lasted < 10 seconds, ? AT, cannot exclude AF. Now s/p ILR.    Plan:   1. ASA/plavix and high dose statin.  2. Follow up echo.  3. BP management per neuro.  4. Previously discussed with Neuro attending Dr Scott.  No indication for SANAZ at this time   5. D/c planning post echo.

## 2024-04-25 NOTE — DISCHARGE NOTE PROVIDER - NSDCMRMEDTOKEN_GEN_ALL_CORE_FT
losartan 100 mg oral tablet: 1 tab(s) orally once a day  omeprazole 20 mg oral delayed release capsule: 1 cap(s) orally once a day  Outpatient speech therapy: Outpatient speech therapy for expressive aphasia s/p stroke (ICD 10  F80.1)  sildenafil 100 mg oral tablet: 1 tab(s) orally once a day as needed for erectile dysfuction   aspirin 81 mg oral tablet, chewable: 1 tab(s) orally once a day  losartan 100 mg oral tablet: 1 tab(s) orally once a day  omeprazole 20 mg oral delayed release capsule: 1 cap(s) orally once a day  Outpatient speech therapy: Outpatient speech therapy for expressive aphasia s/p stroke (ICD 10  F80.1)

## 2024-04-25 NOTE — DISCHARGE NOTE PROVIDER - NSDCQMSTROKERISK_NEU_ALL_CORE
History of a stroke or TIA Diabetes/High blood pressure/High cholesterol/History of a stroke or TIA Diabetes/High blood pressure/History of a stroke or TIA

## 2024-04-25 NOTE — DISCHARGE NOTE PROVIDER - NSDCCPCAREPLAN_GEN_ALL_CORE_FT
PRINCIPAL DISCHARGE DIAGNOSIS  Diagnosis: Ischemic stroke  Assessment and Plan of Treatment: You were admitted to the hospital because you had a ischemic stroke  You have these risks factors of strokes:   -high blood pressure (Hypertension)   -diabetes mellitus  -high cholesterol (also called hyperlipidemia)   -still undefined - will continue to be evaluated and monitored as an outpatient  For secondary stroke prevention please take your medications as prescribed. If you run low on your medication, please contact your doctor before you run out.  You will follow up outpatient with the vascular neurology team, the office will call you within 3 days of discharge to schedule an appointment.  If you do not hear from the office please call the phone number provided.    Please see all regularly scheduled providers as prior to your admission. Please see your primary care doctor within one week of discharge.  In addition discuss with your primary care provider regarding candidacy for routine malignancy screenings.   Adjustments to your regular tasks may be difficult after you've had a stroke, but you can utilize  new ways/assistance as needed to manage your daily activities based on the recommendations of your physical, occupational, speech and language team if applicable.    Call 911 if you or someone you know experiences the following symptoms of stroke (can be remembered by BE FAST):  •Balance: Dizziness, loss of balance, or a sense of falling  •Eyes: Sudden double vision, loss of vision, or blurred vision  •Face: drooping of one side of the face  •Arm: arm weakness or drifting  •Speech:  Sudden trouble talking or slurred speech, trouble understanding others  •Time: Time to call for an ambulance fast!         SECONDARY DISCHARGE DIAGNOSES  Diagnosis: Hypertension  Assessment and Plan of Treatment: Maintain a journal record of your blood pressure twice a day to show your primary care/cardiologist .    Diagnosis: Diabetes  Assessment and Plan of Treatment: please see your primary care provider and/or endocrinologist for further recommendations regarding your diabetic regimen also monitor your finger stick glucose as recommended by your primary care and/or endocrinologist in a journal.    Diagnosis: Hyperlipemia  Assessment and Plan of Treatment: please see nutritionist and take your statin therapy if you were prescribed one.  Your liver function will need to be monitored.     PRINCIPAL DISCHARGE DIAGNOSIS  Diagnosis: Ischemic stroke  Assessment and Plan of Treatment: You were admitted to the hospital because you had a ischemic stroke  You have these risks factors of strokes:   -high blood pressure (Hypertension)   -pre-diabetes mellitus  -high cholesterol (also called hyperlipidemia)   -still undefined - will continue to be evaluated and monitored as an outpatient  For secondary stroke prevention please take your medications as prescribed. If you run low on your medication, please contact your doctor before you run out.  You will follow up outpatient with the vascular neurology team, the office will call you within 3 days of discharge to schedule an appointment.  If you do not hear from the office please call the phone number provided.    Please see all regularly scheduled providers as prior to your admission. Please see your primary care doctor within one week of discharge.  In addition discuss with your primary care provider regarding candidacy for routine malignancy screenings.   Adjustments to your regular tasks may be difficult after you've had a stroke, but you can utilize  new ways/assistance as needed to manage your daily activities based on the recommendations of your physical, occupational, speech and language team if applicable.    Call 911 if you or someone you know experiences the following symptoms of stroke (can be remembered by BE FAST):  •Balance: Dizziness, loss of balance, or a sense of falling  •Eyes: Sudden double vision, loss of vision, or blurred vision  •Face: drooping of one side of the face  •Arm: arm weakness or drifting  •Speech:  Sudden trouble talking or slurred speech, trouble understanding others  •Time: Time to call for an ambulance fast!         SECONDARY DISCHARGE DIAGNOSES  Diagnosis: Hypertension  Assessment and Plan of Treatment: Maintain a journal record of your blood pressure twice a day to show your primary care/cardiologist .  Patient was discharged on his home medication of Losartan 100mg daily. Due to recent stroke patient should not take his hydrochlorthiazide for one week until he follows up with his PCP. After evaluation in one week PCP can add on accordingly. .    Diagnosis: Pre-diabetes  Assessment and Plan of Treatment: please see your primary care provider and/or endocrinologist for further recommendations regarding your diabetic regimen also monitor your finger stick glucose as recommended by your primary care and/or endocrinologist in a journal.    Diagnosis: SVT (supraventricular tachycardia)  Assessment and Plan of Treatment: few beats noted. please follow up with your Cardiologist and continue to have your loop interogated

## 2024-04-25 NOTE — DISCHARGE NOTE PROVIDER - CARE PROVIDER_API CALL
Amy Scott  Neurology  [address] [place] [address]  Phone: [phone]  Fax: [phone]  Follow Up Time: 1 week    Cameron Pantoja  Cardiac Electrophysiology  [address] [facility], [address]  [place] [address]  Phone: [phone]  Fax: [phone]  Follow Up Time: 1 week    [facility]  [address] [place] [address]  Phone: [phone]  Fax: [phone]  Follow Up Time: 1 week    Clarke Hodge  Cardiology  [address] [place] [address]  Phone: [phone]  Fax: [phone]  Follow Up Time: 1 week

## 2024-04-25 NOTE — DISCHARGE NOTE PROVIDER - HOSPITAL COURSE
79yo M with hx of HTN, GERD presented to ED by [facility] stroke unit with aphasia and found to have distal LM2 occlusion. Repeat imaging at [facility] showed occlusive M2 segment left MCA clot without gross evidence of acute intracranial hemorrhage. Pt was deemed out of window for tenecteplase, case was discussed with Dr. Colin Neuro IR, code Biplane was canceled after pt has full resolution of sx on exam. Patient was started on ASA 81mg daily and Plavix 75mg daily.     Impression: Left MCA stroke, due to cerebral embolism - left MCA M2 occlusion. Embolic stroke of undetermined source     Electrophysiology saw pt this AM for brief run of SVT <10s. No new neurologic complaints. 77yo M with hx of HTN, GERD presented to ED by [facility] stroke unit with aphasia and found to have distal LM2 occlusion. [facility] at [facility] showed occlusive M2 segment left MCA clot without gross evidence of acute intracranial hemorrhage. Pt was deemed out of window for tenecteplase, case was discussed with Dr. Colin Neuro IR, code Biplane was canceled after pt has full resolution of sx on exam. Patient was started on ASA 81mg daily and Plavix 75mg initially, however will just be going home on ASA 81mg daily. A statin was not indicated at this time as patient had an LDL of 19, was not on a statin previously, and etiology of stroke was nonatherosclerotic.     Impression: Left MCA stroke, due to cerebral embolism - left MCA M2 occlusion. Embolic stroke of undetermined source     Brief episode of SVT <10 seconds on telemetry a few times on admission, as well as ? Vtach for 4 beats on tele. Patient was always aymptomatic. Cardiology and EP were consulted. Cardiology stated that ECG was unremarkable. Patient had ILR placed for long term monitoring and should follow up outpatient with Dr. Pantoja and Cardiologist. No SANAZ was indicated at this time.     H/H with new mild anemia (12.8/37.2). Patient should have this monitored outpatient with his PCP. patient should have all age and risk appropriate malignancy screenings with PCP or sooner if clinically     Patient was discharged on his home medication of Losartan 100mg daily. Due to recent stroke patient should not take his hydrochlorthiazide for one week until he follows up with his PCP. After evaluation in one week PCP can add on accordingly.        77yo M with hx of HTN, GERD presented to ED by [facility] stroke unit with aphasia and found to have distal LM2 occlusion. Repeat imaging at [facility] showed occlusive M2 segment left MCA clot without gross evidence of acute intracranial hemorrhage. Pt was deemed out of window for tenecteplase, case was discussed with Dr. Colin Neuro IR, code Biplane was canceled after pt has full resolution of sx on exam. Patient was started on ASA 81mg daily and Plavix 75mg initially, however will just be going home on ASA 81mg daily. A statin was not indicated at this time as patient had an LDL of 19, was not on a statin previously, and etiology of stroke was nonatherosclerotic.     Impression: Left MCA stroke, due to cerebral embolism - left MCA M2 occlusion. Embolic stroke of undetermined source     Brief episode of SVT <10 seconds on telemetry a few times on admission, as well as ? Vtach for 4 beats on tele. Patient was always aymptomatic. Cardiology and EP were consulted. Cardiology stated that ECG was unremarkable. Patient had ILR placed for long term monitoring and should follow up outpatient with Dr. Pantoja and Cardiologist. No SANAZ was indicated at this time.     H/H with new mild anemia (12.8/37.2). Patient should have this monitored outpatient with his PCP. patient should have all age and risk appropriate malignancy screenings with PCP or sooner if clinically     Patient was discharged on his home medication of Losartan 100mg daily. Due to recent stroke patient should not take his hydrochlorthiazide for one week until he follows up with his PCP. After evaluation in one week PCP can add on accordingly.     IMAGING: Reviewed by me.     NEURO IMAGING    MR Head No Cont (04.24.24 @ 05:54)  IMPRESSION:   ACUTE LEFT MCA TERRITORY INFARCT. NO ACUTE INTRACRANIAL HEMORRHAGE.    CT Brain Stroke Protocol/CT Angiogram Head/Neck (04.23.24 @ 19:51)  IMPRESSION:  1.  Occlusive M2 segment left MCA clot at the inferior division, with   brain tissue at risk.  2.  Retained contrast material, without gross evidence of acute   intracranial hemorrhage.    TTE Limited W or WO Ultrasound Enhancing Agent (04.25.24 @ 14:17)   CONCLUSIONS:   1. Left ventricular systolic function is normal with an ejection fraction of 60 % by Muro's method of disks.   2. The left atrium is normal in size.   3. No pericardial effusion seen.   4. Estimated pulmonary artery systolic pressure is 16 mmHg.    US Duplex Venous Lower Ext Complete, Bilateral (04.25.24 @ 11:48)   IMPRESSION:  No evidence of deep venous thrombosis in either lower extremity.

## 2024-04-25 NOTE — DISCHARGE NOTE PROVIDER - CARE PROVIDERS DIRECT ADDRESSES
[email or web address]

## 2024-04-25 NOTE — PROGRESS NOTE ADULT - SUBJECTIVE AND OBJECTIVE BOX
Preliminary note, official recommendations pending attending review/signature   Seen and examined by Stroke team attending/team, assessment/ plan as discussed with stroke team attending/team as noted.     [facility] Stroke Team  Progress Note     HPI:  79yo M with hx of HTN, GERD presented to ED by [facility] stroke unit with aphasia and found to have distal LM2 occlusion.  Repeat imaging here showed occlusive M2 segment left MCA clot at the inferior division, with   brain tissue at risk and retained contrast material, without gross evidence of acute intracranial hemorrhage. Pt was deemed out of window for TNK, but case was discussed with Dr. Colin Neuro IR and initially code Biplane was activated for mechanical thrombectomy but called after pt was examined by Dr. Colin and had full resolution of symptoms. In the ED pt loaded with Plavix and ASA and admitted further management. RN called to evaluate pt for mld aphasia noted on neurochecks. Pt seen at bedside, with mild aphasia with NIHSS 1 which was 0 previously. Pt had no other neurological decifit at this time, reach out to Dr. Colin, recommended permissive hypertension and pt's symptoms mild and does not warranty acute intervention at this time. Pt has no other acute complaints at this time        SUBJECTIVE: No events overnight. No new neurologic complaints.  ROS reported negative unless otherwise noted.    aspirin  chewable 81 milliGRAM(s) Oral daily  atorvastatin 80 milliGRAM(s) Oral at bedtime  clopidogrel Tablet 75 milliGRAM(s) Oral every 24 hours  heparin   Injectable 5000 Unit(s) SubCutaneous every 12 hours  pantoprazole    Tablet 40 milliGRAM(s) Oral before breakfast  sodium chloride 0.9%. 1000 milliLiter(s) IV Continuous <Continuous>      PHYSICAL EXAM:   Vital Signs Last 24 Hrs  T(C): 36.7 (2024 04:02), Max: 36.8 (2024 07:42)  T(F): 98 (2024 04:02), Max: 98.3 (2024 15:21)  HR: 60 (2024 06:00) (50 - 60)  BP: 137/78 (2024 06:00) (137/66 - 174/77)  BP(mean): 97 (2024 06:00) (80 - 97)  RR: 19 (2024 06:00) (17 - 19)  SpO2: 100% (2024 06:00) (97% - 100%)    Parameters below as of 2024 06:00  Patient On (Oxygen Delivery Method): room air      INCOMPLETE EXAM  General: No acute distress.   HEENT: Head normocephalic, atraumatic. Conjunctivae clear w/o exudates or hemorrhage. Sclera non-icteric. Nares are patent bilaterally. Mucous membranes pink and moist.  No tonsillar swelling or exudates.    Neck: Supple, no adenopathy. Trachea midline. No JVD.  Cardiac: Normal rate and rhythm. S1, S2 auscultated.   Respiratory: Lung sounds clear in all fields. Chest wall symmetric, nontender.   Abdominal: Soft, nondistended, nontender. Bowel sounds normoactive x 4 quadrants.    Skin: Skin is warm, dry and intact without rashes or lesions.    Extremities: No edema, .     Detailed Neurologic Exam: [facility]    Mental status: The patient is awake and alert and has normal attention span.  able to ID daughter. Word finding pauses, some impaired simple command following, breaks down on more complex commands, paraphasic errors, moderately impaired reading due to paraphasic errors. Speech fluent. perseverates, The patient is oriented to age/, april (27 or 26), wed, disoriented to year, repetition intact . IDs most objects- occasional paraphasias, The patient is aware of current neurological deficits , singing intact     Cranial nerves: subtle right facial palsy, Pupils equal and react symmetrically to light. There is no visual field deficit to confrontation. Extraocular motion is full with no nystagmus. There is no ptosis. Facial sensation is intact.   Palate elevates symmetrically. Tongue is midline.    Motor: There is normal bulk and tone.  There is no tremor.  Strength is 5/5 in the right arm and leg.   Strength is 5/5 in the left arm and leg.    Sensation: Intact to light touch and pin in 4 extremities    Cerebellar: There is no dysmetria on finger to nose testing.    Gait : deferred      LABS:                        12.8   6.52  )-----------(       ( 2024 05:20 )             37.2    04-23    139  |  101  |  21.9<H>  ----------------------------<  97  4.7   |  27.0  |  0.84    Ca    9.8      2024 19:40    TPro  6.7  /  Alb  4.4  /  TBili  0.4  /  DBili  x   /  AST  27  /  ALT  27  /  AlkPhos  45  04-23  PT/INR - ( 2024 19:40 )   PT: 11.4 sec;   INR: 1.03 ratio         PTT - ( 2024 19:40 )  PTT:30.5 sec    A1C: 5.9              IMAGING: Reviewed by me.     NEURO IMAGING    MR Head No Cont (24 @ 05:54)  IMPRESSION:   ACUTE LEFT MCA TERRITORY INFARCT. NO ACUTE INTRACRANIAL HEMORRHAGE.    CT Brain Stroke Protocol/CT angiogram Head/Neck (24 @ 19:51)  IMPRESSION:  1.  Occlusive M2 segment left MCA clot at the inferior division, with   brain tissue at risk.  2.  Retained contrast material, without gross evidence of acute   intracranial hemorrhage.           Preliminary note, official recommendations pending attending review/signature   Seen and examined by Stroke team attending/team, assessment/ plan as discussed with stroke team attending/team as noted.     [facility] Stroke Team  Progress Note     HPI:  79yo M with hx of HTN, GERD presented to ED by [facility] stroke unit with aphasia and found to have distal LM2 occlusion. Repeat imaging at [facility] showed occlusive M2 segment left MCA clot at the inferior division, with   brain tissue at risk and retained contrast material, without gross evidence of acute intracranial hemorrhage. Pt was deemed out of window for tenecteplase, but case was discussed with Dr. Colin Neuro IR and initially code Biplane was activated for mechanical thrombectomy but called after pt was examined by Dr. Colin and had full resolution of symptoms. In the ED pt loaded with Plavix and ASA and admitted further management.     SUBJECTIVE: Electrophysiology saw pt this AM for brief run of SVT <10s. No new neurologic complaints.   ROS reported negative unless otherwise noted.    aspirin  chewable 81 milliGRAM(s) Oral daily  atorvastatin 80 milliGRAM(s) Oral at bedtime  clopidogrel Tablet 75 milliGRAM(s) Oral every 24 hours  heparin   Injectable 5000 Unit(s) SubCutaneous every 12 hours  pantoprazole    Tablet 40 milliGRAM(s) Oral before breakfast  sodium chloride 0.9%. 1000 milliLiter(s) IV Continuous <Continuous>      PHYSICAL EXAM:   Vital Signs Last 24 Hrs  T(C): 36.7 (2024 04:02), Max: 36.8 (2024 07:42)  T(F): 98 (2024 04:02), Max: 98.3 (2024 15:21)  HR: 60 (2024 06:00) (50 - 60)  BP: 137/78 (2024 06:00) (137/66 - 174/77)  BP(mean): 97 (2024 06:00) (80 - 97)  RR: 19 (2024 06:00) (17 - 19)  SpO2: 100% (2024 06:00) (97% - 100%)    Parameters below as of 2024 06:00  Patient On (Oxygen Delivery Method): room air    General: No acute distress.   HEENT: Head normocephalic, atraumatic. Conjunctivae clear w/o exudates or hemorrhage. Sclera non-icteric. Nares are patent bilaterally. Mucous membranes pink and moist.    Neck: Supple, no adenopathy. Trachea midline. No JVD.  Cardiac: Normal rate and rhythm. S1, S2 auscultated.   Respiratory: Lung sounds clear in all fields.   Abdominal: Soft, nondistended, nontender. Bowel sounds normoactive x 4 quadrants.    Skin: Skin is warm, dry and intact without rashes or lesions.    Extremities: No edema.    Detailed Neurologic Exam: [facility]    Mental status: The patient is awake and alert and has normal attention span. Is able to ID daughter at the bedside and follow commands. Word finding pauses, inserts words, paraphasic errors, speech more fluent than yesterday. The patient is alert and oriented x3, repetition intact. IDs most objects - occasional paraphasias. The patient is aware of current neurological deficits, knows he had a stroke, struggled to think of the word "clot".    Cranial nerves: subtle right facial palsy, pupils equal and react symmetrically to light. There is no visual field deficit to confrontation. Extraocular motion is full with no nystagmus. There is no ptosis. Facial sensation is intact.  Palate elevates symmetrically. Tongue is midline.    Motor: There is normal bulk and tone. There is no tremor.  Strength is 5/5 in the right arm and leg.   Strength is 5/5 in the left arm and leg.    Sensation: Intact to light touch and pin in 4 extremities    Cerebellar: There is no dysmetria on finger to nose testing.    Gait : deferred      LABS:                        12.8   6.52  )-----------(  05:20 )             37.2    04-23    139  |  101  |  21.9<H>  ----------------------------<  97  4.7   |  27.0  |  0.84    Ca    9.8      2024 19:40    TPro  6.7  /  Alb  4.4  /  TBili  0.4  /  DBili  x   /  AST  27  /  ALT  27  /  AlkPhos  45  04-23  PT/INR - ( 2024 19:40 )   PT: 11.4 sec;   INR: 1.03 ratio         PTT - ( 2024 19:40 )  PTT:30.5 sec    LDL: 19  HDL: 28 (L)  Tri (H)    A1C: 5.9      IMAGING: Reviewed by me.     NEURO IMAGING    MR Head No Cont (24 @ 05:54)  IMPRESSION:   ACUTE LEFT MCA TERRITORY INFARCT. NO ACUTE INTRACRANIAL HEMORRHAGE.    CT Brain Stroke Protocol/CT Angiogram Head/Neck (24 @ 19:51)  IMPRESSION:  1.  Occlusive M2 segment left MCA clot at the inferior division, with   brain tissue at risk.  2.  Retained contrast material, without gross evidence of acute   intracranial hemorrhage.             [facility] Stroke Team  Progress Note     HPI:  79yo M with hx of HTN, GERD presented to ED by [facility] stroke unit with aphasia and found to have distal LM2 occlusion. Repeat imaging at [facility] showed occlusive M2 segment left MCA clot at the inferior division, with   brain tissue at risk and retained contrast material, without gross evidence of acute intracranial hemorrhage. Pt was deemed out of window for tenecteplase, but case was discussed with Dr. Colin Neuro IR and initially code Biplane was activated for mechanical thrombectomy but called after pt was examined by Dr. Colin and had full resolution of symptoms. In the ED pt loaded with Plavix and ASA and admitted further management.     SUBJECTIVE: Electrophysiology saw pt this AM for brief run of SVT <10s. No new neurologic complaints.   ROS reported negative unless otherwise noted.    aspirin  chewable 81 milliGRAM(s) Oral daily  atorvastatin 80 milliGRAM(s) Oral at bedtime  clopidogrel Tablet 75 milliGRAM(s) Oral every 24 hours  heparin   Injectable 5000 Unit(s) SubCutaneous every 12 hours  pantoprazole    Tablet 40 milliGRAM(s) Oral before breakfast  sodium chloride 0.9%. 1000 milliLiter(s) IV Continuous <Continuous>      PHYSICAL EXAM:   Vital Signs Last 24 Hrs  T(C): 36.7 (2024 04:02), Max: 36.8 (2024 07:42)  T(F): 98 (2024 04:02), Max: 98.3 (2024 15:21)  HR: 60 (2024 06:00) (50 - 60)  BP: 137/78 (2024 06:00) (137/66 - 174/77)  BP(mean): 97 (2024 06:00) (80 - 97)  RR: 19 (2024 06:00) (17 - 19)  SpO2: 100% (2024 06:00) (97% - 100%)    Parameters below as of 2024 06:00  Patient On (Oxygen Delivery Method): room air    General: No acute distress.   HEENT: Head normocephalic, atraumatic. Conjunctivae clear w/o exudates or hemorrhage. Sclera non-icteric. Nares are patent bilaterally. Mucous membranes pink and moist.    Neck: Supple, no adenopathy. Trachea midline. No JVD.  Cardiac: Normal rate and rhythm. S1, S2 auscultated.   Respiratory: Lung sounds clear in all fields.   Abdominal: Soft, nondistended, nontender. Bowel sounds normoactive x 4 quadrants.    Skin: Skin is warm, dry and intact without rashes or lesions.    Extremities: No edema.    Detailed Neurologic Exam: [facility]    Mental status: The patient is awake and alert and has normal attention span. Is able to ID daughter at the bedside and follow commands. Word finding pauses, inserts words, paraphasic errors, speech more fluent than yesterday. The patient is alert and oriented x3, repetition intact. IDs most objects - occasional paraphasias. The patient is aware of current neurological deficits, knows he had a stroke, struggled to think of the word "clot".    Cranial nerves: subtle right facial palsy, pupils equal and react symmetrically to light. There is no visual field deficit to confrontation. Extraocular motion is full with no nystagmus. There is no ptosis. Facial sensation is intact.  Palate elevates symmetrically. Tongue is midline.    Motor: There is normal bulk and tone. There is no tremor.  Strength is 5/5 in the right arm and leg.   Strength is 5/5 in the left arm and leg.    Sensation: Intact to light touch and pin in 4 extremities    Cerebellar: There is no dysmetria on finger to nose testing.    Gait : deferred      LABS:                        12.8   6.52  )-----------(       ( 2024 05:20 )             37.2    -    139  |  101  |  21.9<H>  ----------------------------<  97  4.7   |  27.0  |  0.84    Ca    9.8      2024 19:40    TPro  6.7  /  Alb  4.4  /  TBili  0.4  /  DBili  x   /  AST  27  /  ALT  27  /  AlkPhos  45  -  PT/INR - ( 2024 19:40 )   PT: 11.4 sec;   INR: 1.03 ratio         PTT - ( 2024 19:40 )  PTT:30.5 sec    LDL: 19  HDL: 28 (L)  Tri (H)    A1C: 5.9      IMAGING: Reviewed by me.     NEURO IMAGING    MR Head No Cont (24 @ 05:54)  IMPRESSION:   ACUTE LEFT MCA TERRITORY INFARCT. NO ACUTE INTRACRANIAL HEMORRHAGE.    CT Brain Stroke Protocol/CT Angiogram Head/Neck (24 @ 19:51)  IMPRESSION:  1.  Occlusive M2 segment left MCA clot at the inferior division, with   brain tissue at risk.  2.  Retained contrast material, without gross evidence of acute   intracranial hemorrhage.

## 2024-04-25 NOTE — PROGRESS NOTE ADULT - REASON FOR ADMISSION
Left MCA M2 segment occulusion   Aphasia

## 2024-04-25 NOTE — DISCHARGE NOTE PROVIDER - NSDCFUADDINST_GEN_ALL_CORE_FT
Please follow up with outpatient speech therapy and utilize all persons and devices recommended to you by your rehab team.

## 2024-05-08 ENCOUNTER — NON-APPOINTMENT (OUTPATIENT)
Age: 79
End: 2024-05-08

## 2024-05-08 ENCOUNTER — APPOINTMENT (OUTPATIENT)
Dept: CARDIOLOGY | Facility: CLINIC | Age: 79
End: 2024-05-08
Payer: MEDICARE

## 2024-05-08 VITALS
OXYGEN SATURATION: 98 % | DIASTOLIC BLOOD PRESSURE: 50 MMHG | SYSTOLIC BLOOD PRESSURE: 140 MMHG | BODY MASS INDEX: 28.58 KG/M2 | HEART RATE: 47 BPM | RESPIRATION RATE: 15 BRPM | HEIGHT: 69 IN | WEIGHT: 193 LBS

## 2024-05-08 DIAGNOSIS — I10 ESSENTIAL (PRIMARY) HYPERTENSION: ICD-10-CM

## 2024-05-08 DIAGNOSIS — Z78.9 OTHER SPECIFIED HEALTH STATUS: ICD-10-CM

## 2024-05-08 DIAGNOSIS — I63.10 CEREBRAL INFARCTION DUE TO EMBOLISM OF UNSPECIFIED PRECEREBRAL ARTERY: ICD-10-CM

## 2024-05-08 DIAGNOSIS — Z82.49 FAMILY HISTORY OF ISCHEMIC HEART DISEASE AND OTHER DISEASES OF THE CIRCULATORY SYSTEM: ICD-10-CM

## 2024-05-08 PROCEDURE — G2211 COMPLEX E/M VISIT ADD ON: CPT

## 2024-05-08 PROCEDURE — 93000 ELECTROCARDIOGRAM COMPLETE: CPT

## 2024-05-08 PROCEDURE — 99214 OFFICE O/P EST MOD 30 MIN: CPT

## 2024-05-08 RX ORDER — OMEPRAZOLE 20 MG/1
20 CAPSULE, DELAYED RELEASE ORAL
Refills: 0 | Status: ACTIVE | COMMUNITY

## 2024-05-08 RX ORDER — LOSARTAN POTASSIUM 100 MG/1
100 TABLET, FILM COATED ORAL DAILY
Refills: 0 | Status: ACTIVE | COMMUNITY

## 2024-05-08 RX ORDER — HYDROCHLOROTHIAZIDE 12.5 MG/1
12.5 TABLET ORAL DAILY
Qty: 90 | Refills: 1 | Status: ACTIVE | COMMUNITY
Start: 2024-05-08 | End: 1900-01-01

## 2024-05-08 NOTE — DISCUSSION/SUMMARY
[FreeTextEntry1] : 1.  No additional cardiac testing at this time. 2.  Restart hydrochlorothiazide at 12.5 mg daily.  Blood work in 2 weeks. 3.  Continue other current cardiac meds in doses as noted above for CVA and hypertension. 4.  He will continue to have his loop recorder monitored by EP.  We could have that transferred to this office. 5.  Encourage patient to continue to be active. 6.  Follow-up with neurology and EP. 7.  Monitor BP at home, keep a log and bring to f/u. 8.  Follow up here in three months. [EKG obtained to assist in diagnosis and management of assessed problem(s)] : EKG obtained to assist in diagnosis and management of assessed problem(s)

## 2024-05-08 NOTE — ASSESSMENT
[FreeTextEntry1] : Echocardiogram April 25, 2024 demonstrated left ventricle normal in size and function with ejection fraction of 60%.  EKG: Sinus rhythm with no significant ST or T wave changes.    78-year-old man with past medical history of hypertension and dyslipidemia who presented recently to the hospital with aphasia and a left MCA stroke.  Patient ultimately did not need treatment for the stroke and his symptoms all resolved.  He was discharged on just aspirin and losartan.  His LDL was very low and the stroke was deemed to be embolic so he was not placed on statin therapy.  He has not been monitoring his blood pressure at home.  It is a bit high here today and he was on hydrochlorothiazide in the past we will restart that at 12.5 mg daily and he will have blood work done.  He needs to follow-up with neurology and EP.  He had a loop recorder placed and we will continue to monitor that for evidence of atrial fibrillation or atrial flutter as a cause of his stroke.

## 2024-05-08 NOTE — HISTORY OF PRESENT ILLNESS
[FreeTextEntry1] : Patient presents to the office today after having been seen in the hospital recently where he presented with a stroke.  His symptoms were aphasia and confusion.  He was found to have an MCA clot but was outside the window for tenecteplase.  Plan was for thrombectomy but his symptoms improved and resolved and this was canceled.  He was treated and released.  Since being home he is feeling very well.  He has already played racquetball twice since he got home.  He reports no physical symptoms at all.  The symptoms of his stroke are completely resolved.  Patient denies chest pain, shortness of breath, palpitations, orthopnea, presyncope, syncope.

## 2024-05-10 ENCOUNTER — APPOINTMENT (OUTPATIENT)
Dept: NEUROLOGY | Facility: CLINIC | Age: 79
End: 2024-05-10
Payer: MEDICARE

## 2024-05-10 VITALS
HEIGHT: 69 IN | OXYGEN SATURATION: 98 % | DIASTOLIC BLOOD PRESSURE: 58 MMHG | SYSTOLIC BLOOD PRESSURE: 140 MMHG | WEIGHT: 193 LBS | BODY MASS INDEX: 28.58 KG/M2 | HEART RATE: 55 BPM

## 2024-05-10 PROCEDURE — 99215 OFFICE O/P EST HI 40 MIN: CPT

## 2024-05-10 NOTE — DATA REVIEWED
[TextEntry] : MR Head No Cont (24 @ 05:54) IMPRESSION:  ACUTE LEFT MCA TERRITORY INFARCT. NO ACUTE INTRACRANIAL HEMORRHAGE.  CT Brain Stroke Protocol/CT Angiogram Head/Neck (24 @ 19:51) IMPRESSION: 1.  Occlusive M2 segment left MCA clot at the inferior division, with  brain tissue at risk. 2.  Retained contrast material, without gross evidence of acute  intracranial hemorrhage.  TTE Limited W or WO Ultrasound Enhancing Agent (24 @ 14:17)  CONCLUSIONS:  1. Left ventricular systolic function is normal with an ejection fraction of 60 % by Muro's method of disks.  2. The left atrium is normal in size.  3. No pericardial effusion seen.  4. Estimated pulmonary artery systolic pressure is 16 mmHg.  US Duplex Venous Lower Ext Complete, Bilateral (24 @ 11:48)  IMPRESSION: No evidence of deep venous thrombosis in either lower extremity.  LDL: 19 HDL: 28 (L) Tri (H)  A1C: 5.9

## 2024-05-10 NOTE — PHYSICAL EXAM
[FreeTextEntry1] :   General: Cooperative, NAD HEENT: NC/AT, no carotid bruits Lungs: CTAB Chest: RRR, no murmurs Extremities: nontender, no erythema Neurological Examination: NIHSS 1 MS: AOx3. Appropriately interactive, normal affect. Speech fluent w/o paraphasic errors, able to name, repeat CN: PERLL, EOMI, V1-3 sensation intact, mild R facial assymetry, hearing intact, palate elevates symmetrically, tongue midline, SCM equal bilaterally Motor: normal bulk and tone, no tremor, rigidity or bradykinesia.  5/5 all over Sens: Intact to light touch. Reflexes: 2/4 all over, downgoing toes b/l Coord:  No dysmetria, DAMARI intact Gait: Normal

## 2024-05-10 NOTE — HISTORY OF PRESENT ILLNESS
[FreeTextEntry1] :  [facility] NEUROLOGY AT [place]  CC: Stroke HPI: 79 y/o M with HTN, GERD, who presents for hospital f/u of recent LMCA territory stroke in April 2024.   Neuro Hx:  Patient presented on 4/23/24 to [facility] via [facility] stroke unit with aphasia and found to have distal LM2 occlusion. Repeat imaging at [facility] showed occlusive M2 segment left MCA clot without gross evidence of acute intracranial hemorrhage. Pt was deemed out of window for tenecteplase.  The case was discussed with Dr. Colin Neuro IR, and code Biplane was canceled after pt had full resolution of symptoms on exam. Patient was started on ASA 81mg daily and Plavix 75mg initially and eventually discharged on monotherapy with ASA 81mg daily. A statin was not indicated at this time as patient had an LDL of 19, was not on a statin previously, and etiology of stroke was nonatherosclerotic. While hospitalized had a brief episode of SVT <10 seconds on telemetry a few times on admission, as well as ?Vtach for 4 beats on tele. Patient was always aymptomatic. Cardiology and EP were consulted. Cardiology stated that ECG was unremarkable. Patient had ILR placed for long term monitoring. No SANAZ was indicated at this time.  Stroke thought to be due to ESUS.    Today 5/10/24: Patient doing well. States his speech is markedly better, occasionally forgets a word in the phrase, but feels remarkeably well.  Playing racquetball.   Is playing guitar twice a week.  No issues with the aspirin.  ILR in place. Saw cardiologist 2 days prior, restarted on HCTZ.  Will be seeing EP next week.

## 2024-05-10 NOTE — ASSESSMENT
[FreeTextEntry1] : 79yo M with hx of HTN, GERD presents for hospital f/u of LMCA territory stroke with distal LM2 occlusion--did not receive TNK, nor thrombectomy (resolution of symptoms).    PLAN:  Left MCA stroke, due to cerebral embolism - left MCA M2 occlusion Stroke mechanism: Embolic stroke of undetermined source  NEURO:  - Cont. ASA 81mg daily - ILR in place, follow interrogations          - Prediabetic with a1c 5.9--emphasized lifestyle/diet changes - Encouraged to keep BP log, goal BP < 140  We spoke about the importance of lifestyle factors including refraining from tobacco use, diet (Mediterranean diet) that emphasizes vegetables, fruits, and whole grains and includes low-fat dairy products, poultry, fish, legumes, olive oil, and nuts while limiting intake of sweets and red meats, moderate- to vigorous-intensity aerobic physical exercise lasting at least 40 minutes 3-4 times per week, adequate glucose control, medication compliance, and consistent outpatient follow-up for monitoring of blood pressure, glucose levels and lipids with a goal blood pressure under 140/90, hemoglobin A1c < 7.0 and goal LDL under 70 that will help in reducing future stroke risk.  We discussed the importance of adequate hydration and making sure to drink eight eight-ounce glasses of water daily.   We also discussed general symptoms that should prompt immediate presentation to the emergency department for evaluation of acute stroke including: sudden onset of focal weakness, numbness, difficulty with speech production or comprehension, slurred speech, visual changes, gait imbalance, and/or sudden/severe headache.

## 2024-05-13 ENCOUNTER — APPOINTMENT (OUTPATIENT)
Dept: ELECTROPHYSIOLOGY | Facility: CLINIC | Age: 79
End: 2024-05-13
Payer: MEDICARE

## 2024-05-13 VITALS
BODY MASS INDEX: 28.14 KG/M2 | HEART RATE: 67 BPM | WEIGHT: 190 LBS | SYSTOLIC BLOOD PRESSURE: 131 MMHG | HEIGHT: 69 IN | OXYGEN SATURATION: 97 % | DIASTOLIC BLOOD PRESSURE: 63 MMHG

## 2024-05-13 DIAGNOSIS — Z95.818 PRESENCE OF OTHER CARDIAC IMPLANTS AND GRAFTS: ICD-10-CM

## 2024-05-13 DIAGNOSIS — I63.9 CEREBRAL INFARCTION, UNSPECIFIED: ICD-10-CM

## 2024-05-13 PROCEDURE — 93000 ELECTROCARDIOGRAM COMPLETE: CPT

## 2024-05-13 PROCEDURE — 99214 OFFICE O/P EST MOD 30 MIN: CPT

## 2024-05-13 NOTE — REVIEW OF SYSTEMS
[SOB] : no shortness of breath [Dyspnea on exertion] : not dyspnea during exertion [Chest Discomfort] : no chest discomfort [Lower Ext Edema] : no extremity edema [Palpitations] : no palpitations [Orthopnea] : no orthopnea [PND] : no PND [Syncope] : no syncope [Dizziness] : no dizziness [Negative] : Integumentary

## 2024-05-13 NOTE — DISCUSSION/SUMMARY
[FreeTextEntry1] : MR Gaspar is a 77 y/o male hx reported with HTN, GERD.   Presents to ED with aphasia.  CT imaging demonstrative of occlusive M2 segment Left MCA clot at the inferior division. Pt was deemed out of window for tenecteplase. He then had an ILR placed on 4/25/24 with Dr Pantoja for long term arrhythmia monitoring/subclinical AF.  HE arrives in no acute distress.  ECG illustrates sinus rhythm.  His blood pressure is controlled.  Parasternal ILR site intact, steri strips were removed today in office. Wound healed. ILR remote transmission from today demonstrates normal function. 3 patient triggered episodes with EGMs illustrating sinus rhythm ( of note: patient was pressing the trigger button due to curiosity, no symptoms). AT/AF burden 0.0%  All questions answered. He will follow up again in 3 months for device check  [EKG obtained to assist in diagnosis and management of assessed problem(s)] : EKG obtained to assist in diagnosis and management of assessed problem(s)

## 2024-05-13 NOTE — PHYSICAL EXAM
[Well Developed] : well developed [Well Nourished] : well nourished [Normal S1, S2] : normal S1, S2 [Normal Rate] : normal [Rhythm Regular] : regular [Normal S1] : normal S1 [Normal S2] : normal S2 [No Murmur] : no murmurs heard [No Pitting Edema] : no pitting edema present [Right Carotid Bruit] : no bruit heard over the right carotid [No Abnormalities] : the abdominal aorta was not enlarged and no bruit was heard [Left Carotid Bruit] : no bruit heard over the left carotid [Clear Lung Fields] : clear lung fields [Soft] : abdomen soft [Non Tender] : non-tender [Normal Gait] : normal gait [Moves all extremities] : moves all extremities [Alert and Oriented] : alert and oriented [de-identified] : ILR implant parasternal site intact. Steri strips removed today.

## 2024-05-13 NOTE — HISTORY OF PRESENT ILLNESS
[FreeTextEntry1] : MR Moon arrives today for followup s/p ILR implantation on 4/25/24.  He is a 79 y/o male hx reported with HTN, GERD.   Presents to ED with aphasia.  CT imaging demonstrative of occlusive M2 segment Left MCA clot at the inferior division. Pt was deemed out of window for tenecteplase. He then had an ILR placed on 4/25/24 with Dr Pantoja for long term arrhythmia monitoring/subclinical AF.  TTE: NML LVEF 60%, without wall motion or significant valvular abnormalities.  NO SANAZ performed. US deplex Lower ext B/L: No evidence of deep venous thrombosis in either lower extremity.  HE arrives today feeling well.  He denies chest pains or pressure. He  denies dizzy spells, feeling faint or fainting, He   denies palpitations or difficulty breathing while laying flat. He denies lower extremity swelling.

## 2024-06-12 ENCOUNTER — NON-APPOINTMENT (OUTPATIENT)
Age: 79
End: 2024-06-12

## 2024-06-13 ENCOUNTER — APPOINTMENT (OUTPATIENT)
Dept: ELECTROPHYSIOLOGY | Facility: CLINIC | Age: 79
End: 2024-06-13
Payer: MEDICARE

## 2024-06-13 PROCEDURE — 93298 REM INTERROG DEV EVAL SCRMS: CPT

## 2024-06-24 ENCOUNTER — APPOINTMENT (OUTPATIENT)
Dept: ELECTROPHYSIOLOGY | Facility: CLINIC | Age: 79
End: 2024-06-24
Payer: MEDICARE

## 2024-06-24 PROCEDURE — 99442: CPT | Mod: 93

## 2024-06-24 RX ORDER — APIXABAN 5 MG/1
5 TABLET, FILM COATED ORAL
Qty: 60 | Refills: 6 | Status: ACTIVE | COMMUNITY
Start: 2024-06-24 | End: 1900-01-01

## 2024-07-17 ENCOUNTER — NON-APPOINTMENT (OUTPATIENT)
Age: 79
End: 2024-07-17

## 2024-07-18 ENCOUNTER — APPOINTMENT (OUTPATIENT)
Dept: ELECTROPHYSIOLOGY | Facility: CLINIC | Age: 79
End: 2024-07-18
Payer: MEDICARE

## 2024-07-18 PROCEDURE — 93298 REM INTERROG DEV EVAL SCRMS: CPT

## 2024-07-29 ENCOUNTER — APPOINTMENT (OUTPATIENT)
Dept: ORTHOPEDIC SURGERY | Facility: CLINIC | Age: 79
End: 2024-07-29
Payer: MEDICARE

## 2024-07-29 VITALS — WEIGHT: 190 LBS | HEIGHT: 69 IN | BODY MASS INDEX: 28.14 KG/M2

## 2024-07-29 DIAGNOSIS — Z86.73 PERSONAL HISTORY OF TRANSIENT ISCHEMIC ATTACK (TIA), AND CEREBRAL INFARCTION W/OUT RESIDUAL DEFICITS: ICD-10-CM

## 2024-07-29 DIAGNOSIS — M62.830 MUSCLE SPASM OF BACK: ICD-10-CM

## 2024-07-29 DIAGNOSIS — M25.551 PAIN IN RIGHT HIP: ICD-10-CM

## 2024-07-29 DIAGNOSIS — M47.816 SPONDYLOSIS W/OUT MYELOPATHY OR RADICULOPATHY, LUMBAR REGION: ICD-10-CM

## 2024-07-29 DIAGNOSIS — M16.11 UNILATERAL PRIMARY OSTEOARTHRITIS, RIGHT HIP: ICD-10-CM

## 2024-07-29 PROCEDURE — 73503 X-RAY EXAM HIP UNI 4/> VIEWS: CPT | Mod: RT

## 2024-07-29 PROCEDURE — 99214 OFFICE O/P EST MOD 30 MIN: CPT

## 2024-07-29 NOTE — ASSESSMENT
[FreeTextEntry1] : 7/29/24: Pt with moderate RT hip OA and lumbar DDD- on exam it seems that most of his pain is muscular and coming from his back. Pain is not severe but does have pain everyday. Pt cannot take NSAIDs as he is on Eloquis. Recommend PT/HEP and using heating pad and Tylenol as needed. Follow up as symptoms dictate

## 2024-07-29 NOTE — HISTORY OF PRESENT ILLNESS
[de-identified] : 7/29/24: 78M with RT hip pain x 2 months ago since falling and landing on his backside. Also reports a fall 2 year ago- went to  and was told a hip contusion- resolved with time. Difficulty laying on the RT side. Most pain laterally and posteriorly. Plays raquetball a few times a week. Not taking meds for pain- cannot take NSAIDs due to Eloquis. Ambulates without assistance. Denies N/T.  Hx of stroke 2 months ago- on Eloquis [] : no [FreeTextEntry5] : Right pain for couple years ago. Stated he fell a couple times while playing rac[employer]. Having posterior and lateral pain.

## 2024-07-29 NOTE — IMAGING
[de-identified] : RIGHT HIP no pain with log rolling decreased IR and ER negative impingement negative resisted SLR troch tenderness no groin pain 5/5 strength +2 pt pulses  LUMBAR lower lumbar tenderness  XR RT HIP (7/29/24) showing moderate hip OA bilaterally but showing degen changes in his lumbar spine

## 2024-07-29 NOTE — DISCUSSION/SUMMARY
[de-identified] : The patient was advised of the diagnosis.  The natural history of the pathology was explained in full to the patient in layman's terms. All questions were answered.  The risks and benefits of surgical and non-surgical treatment alternatives were explained in full to the patient.  Entered by Ebony Ballesteros acting as a scribe.

## 2024-07-29 NOTE — IMAGING
[de-identified] : RIGHT HIP no pain with log rolling decreased IR and ER negative impingement negative resisted SLR troch tenderness no groin pain 5/5 strength +2 pt pulses  LUMBAR lower lumbar tenderness  XR RT HIP (7/29/24) showing moderate hip OA bilaterally but showing degen changes in his lumbar spine

## 2024-07-29 NOTE — HISTORY OF PRESENT ILLNESS
[de-identified] : 7/29/24: 78M with RT hip pain x 2 months ago since falling and landing on his backside. Also reports a fall 2 year ago- went to  and was told a hip contusion- resolved with time. Difficulty laying on the RT side. Most pain laterally and posteriorly. Plays raquetball a few times a week. Not taking meds for pain- cannot take NSAIDs due to Eloquis. Ambulates without assistance. Denies N/T.  Hx of stroke 2 months ago- on Eloquis [] : no [FreeTextEntry5] : Right pain for couple years ago. Stated he fell a couple times while playing [employer]. Having posterior and lateral pain.

## 2024-07-29 NOTE — DISCUSSION/SUMMARY
[de-identified] : The patient was advised of the diagnosis.  The natural history of the pathology was explained in full to the patient in layman's terms. All questions were answered.  The risks and benefits of surgical and non-surgical treatment alternatives were explained in full to the patient.  Entered by Ebony Ballesteros acting as a scribe.

## 2024-08-06 ENCOUNTER — NON-APPOINTMENT (OUTPATIENT)
Age: 79
End: 2024-08-06

## 2024-08-06 ENCOUNTER — APPOINTMENT (OUTPATIENT)
Dept: CARDIOLOGY | Facility: CLINIC | Age: 79
End: 2024-08-06

## 2024-08-06 PROBLEM — I48.0 PAF (PAROXYSMAL ATRIAL FIBRILLATION): Status: ACTIVE | Noted: 2024-08-06

## 2024-08-06 PROCEDURE — 99214 OFFICE O/P EST MOD 30 MIN: CPT

## 2024-08-06 PROCEDURE — 93000 ELECTROCARDIOGRAM COMPLETE: CPT

## 2024-08-06 PROCEDURE — G2211 COMPLEX E/M VISIT ADD ON: CPT

## 2024-08-06 NOTE — DISCUSSION/SUMMARY
[FreeTextEntry1] : 1.  Check nuclear stress test given his history of CVA and now PAF. 2.  Continue current cardiac meds in doses as noted above for CVA and hypertension including losartan 100 mg daily. 3.  Continue anticoagulation for stroke prophylaxis for atrial fibrillation. 4.  Follow-up with EP to consider possible atrial fibrillation ablation or other treatment. Loop recorder will continue to be monitored by them. 5.  Encourage patient to continue to be active. 6.  Follow-up with neurology. 7.  Monitor BP at home, keep a log and bring to f/u. 8.  Follow up here after testing, and will make further recommendations at that time. [EKG obtained to assist in diagnosis and management of assessed problem(s)] : EKG obtained to assist in diagnosis and management of assessed problem(s)

## 2024-08-06 NOTE — HISTORY OF PRESENT ILLNESS
[FreeTextEntry1] : Patient presents back to the office today having followed up with the EP and being noted to have atrial fibrillation on his loop recorder.  He has had many episodes, lasting up to 41 hours and 1 episode and a fair amount of RVR.  He was started on Eliquis but not given any rate control medication due to the fact that his baseline rhythm is sinus bradycardia and heart rates are typically in the 40s.  He is going to follow-up with EP to consider other treatment options.  He does not really report any symptoms related to this.  No dizziness or lightheadedness at all.  He does report some occasional left-sided chest discomfort but says that has gone on for many years and seems to occur at random and he relates it to probable gas pains.  Patient denies shortness of breath, palpitations, orthopnea, presyncope, syncope.

## 2024-08-06 NOTE — ASSESSMENT
[FreeTextEntry1] : Echocardiogram April 25, 2024 demonstrated left ventricle normal in size and function with ejection fraction of 60%.  EKG: Sinus rhythm with no significant ST or T wave changes.  PAC noted.  78-year-old man with past medical history of CVA, PAF, hypertension and dyslipidemia who presents today for follow-up.  Patient was found to have atrial fibrillation on his loop recorder.  He was started on Eliquis by electrophysiology.  He was not given any rate control because of his bradycardia at baseline.  Ultimately in atrial fibrillation ablation would likely be the best treatment so he does not have to take any additional medications including antiarrhythmic therapy.  He is willing to consider that and will discuss it with the electrophysiologist.  Blood pressure may be a little bit high.  I have asked him to monitor his blood pressure more closely at home and keep a list of bring it to follow-up.  He seems to have high blood pressures when he first sits down to take it but then it comes down.  Have asked him to record only the second or third reading.

## 2024-08-19 ENCOUNTER — NON-APPOINTMENT (OUTPATIENT)
Age: 79
End: 2024-08-19

## 2024-08-20 ENCOUNTER — NON-APPOINTMENT (OUTPATIENT)
Age: 79
End: 2024-08-20

## 2024-08-21 ENCOUNTER — APPOINTMENT (OUTPATIENT)
Dept: ELECTROPHYSIOLOGY | Facility: CLINIC | Age: 79
End: 2024-08-21
Payer: MEDICARE

## 2024-08-21 PROCEDURE — 93298 REM INTERROG DEV EVAL SCRMS: CPT

## 2024-09-03 ENCOUNTER — APPOINTMENT (OUTPATIENT)
Dept: ELECTROPHYSIOLOGY | Facility: CLINIC | Age: 79
End: 2024-09-03

## 2024-09-06 ENCOUNTER — APPOINTMENT (OUTPATIENT)
Dept: NEUROLOGY | Facility: CLINIC | Age: 79
End: 2024-09-06
Payer: MEDICARE

## 2024-09-06 VITALS
HEART RATE: 60 BPM | WEIGHT: 190 LBS | SYSTOLIC BLOOD PRESSURE: 98 MMHG | OXYGEN SATURATION: 98 % | DIASTOLIC BLOOD PRESSURE: 52 MMHG | BODY MASS INDEX: 28.14 KG/M2 | HEIGHT: 69 IN

## 2024-09-06 PROCEDURE — 99214 OFFICE O/P EST MOD 30 MIN: CPT

## 2024-09-06 NOTE — ASSESSMENT
[FreeTextEntry1] : 77yo M with hx of HTN, GERD presents for hospital f/u of LMCA territory stroke with distal LM2 occlusion--did not receive TNK, nor thrombectomy (resolution of symptoms).  Stroke etiology thought to be cardioembolic as patient's ILR revealed multiple events of Afib.   PLAN:  Left MCA stroke - left MCA M2 occlusion Stroke mechanism: Cardioembolic  NEURO:  - Cont. Eliquis     - Prediabetic with a1c 5.9--emphasized lifestyle/diet changes - Encouraged to keep BP log, goal BP < 140  We spoke about the importance of lifestyle factors including refraining from tobacco use, diet (Mediterranean diet) that emphasizes vegetables, fruits, and whole grains and includes low-fat dairy products, poultry, fish, legumes, olive oil, and nuts while limiting intake of sweets and red meats, moderate- to vigorous-intensity aerobic physical exercise lasting at least 40 minutes 3-4 times per week, adequate glucose control, medication compliance, and consistent outpatient follow-up for monitoring of blood pressure, glucose levels and lipids with a goal blood pressure under 140/90, hemoglobin A1c < 7.0 and goal LDL under 70 that will help in reducing future stroke risk.  We discussed the importance of adequate hydration and making sure to drink eight eight-ounce glasses of water daily.   We also discussed general symptoms that should prompt immediate presentation to the emergency department for evaluation of acute stroke including: sudden onset of focal weakness, numbness, difficulty with speech production or comprehension, slurred speech, visual changes, gait imbalance, and/or sudden/severe headache.

## 2024-09-06 NOTE — HISTORY OF PRESENT ILLNESS
[FreeTextEntry1] :  [facility] NEUROLOGY AT [place]  CC: Stroke HPI: 79 y/o M with HTN, GERD, who presents for hospital f/u of recent LMCA territory stroke in April 2024.   Neuro Hx:  Patient presented on 4/23/24 to [facility] via [facility] stroke unit with aphasia and found to have distal LM2 occlusion. Repeat imaging at [facility] showed occlusive M2 segment left MCA clot without gross evidence of acute intracranial hemorrhage. Pt was deemed out of window for tenecteplase.  The case was discussed with Dr. Colin Neuro IR, and code Biplane was canceled after pt had full resolution of symptoms on exam. Patient was started on ASA 81mg daily and Plavix 75mg initially and eventually discharged on monotherapy with ASA 81mg daily. A statin was not indicated at this time as patient had an LDL of 19, was not on a statin previously, and etiology of stroke was nonatherosclerotic. While hospitalized had a brief episode of SVT <10 seconds on telemetry a few times on admission, as well as ?Vtach for 4 beats on tele. Patient was always aymptomatic. Cardiology and EP were consulted. Cardiology stated that ECG was unremarkable. Patient had ILR placed for long term monitoring. No SANAZ was indicated at this time.  Stroke thought to be due to ESUS.    5/10/24: Patient doing well. States his speech is markedly better, occasionally forgets a word in the phrase, but feels remarkeably well.  Playing racquetball.   Is playing guitar twice a week.  No issues with the aspirin.  ILR in place. Saw cardiologist 2 days prior, restarted on HCTZ.  Will be seeing EP next week.   Today 9/6/24: Was found to have Afib on his ILR and started on Eliquis.  He underwent NST but no results yet.  Pending results, may be under consideration for Ablation.  Occasionally thinks he forgets a word or so but otherwise able to do crossword puzzles.

## 2024-09-06 NOTE — PHYSICAL EXAM
[FreeTextEntry1] :   General: Cooperative, NAD HEENT: NC/AT, no carotid bruits Lungs: CTAB Chest: RRR, no murmurs Extremities: nontender, no erythema Neurological Examination: NIHSS 1 MS: AOx3. Appropriately interactive, normal affect. Speech fluent w/o paraphasic errors, able to name, repeat CN: PERLL, EOMI, V1-3 sensation intact, minimal R facial assymetry, hearing intact, palate elevates symmetrically, tongue midline, SCM equal bilaterally Motor: normal bulk and tone, no tremor, rigidity or bradykinesia.  5/5 all over Sens: Intact to light touch. Reflexes: 2/4 all over, downgoing toes b/l Coord:  No dysmetria, DAMARI intact Gait: Normal

## 2024-10-03 ENCOUNTER — NON-APPOINTMENT (OUTPATIENT)
Age: 79
End: 2024-10-03

## 2024-10-04 ENCOUNTER — APPOINTMENT (OUTPATIENT)
Dept: ELECTROPHYSIOLOGY | Facility: CLINIC | Age: 79
End: 2024-10-04
Payer: MEDICARE

## 2024-10-04 PROCEDURE — 93298 REM INTERROG DEV EVAL SCRMS: CPT

## 2024-10-07 ENCOUNTER — APPOINTMENT (OUTPATIENT)
Dept: PULMONOLOGY | Facility: CLINIC | Age: 79
End: 2024-10-07
Payer: MEDICARE

## 2024-10-07 VITALS
WEIGHT: 187 LBS | SYSTOLIC BLOOD PRESSURE: 148 MMHG | HEIGHT: 69 IN | BODY MASS INDEX: 27.7 KG/M2 | OXYGEN SATURATION: 98 % | RESPIRATION RATE: 16 BRPM | HEART RATE: 53 BPM | DIASTOLIC BLOOD PRESSURE: 50 MMHG

## 2024-10-07 DIAGNOSIS — R91.1 SOLITARY PULMONARY NODULE: ICD-10-CM

## 2024-10-07 DIAGNOSIS — Z87.891 PERSONAL HISTORY OF NICOTINE DEPENDENCE: ICD-10-CM

## 2024-10-07 PROCEDURE — 99214 OFFICE O/P EST MOD 30 MIN: CPT

## 2024-10-18 ENCOUNTER — APPOINTMENT (OUTPATIENT)
Dept: CARDIOLOGY | Facility: CLINIC | Age: 79
End: 2024-10-18

## 2024-10-23 ENCOUNTER — APPOINTMENT (OUTPATIENT)
Dept: CARDIOLOGY | Facility: CLINIC | Age: 79
End: 2024-10-23
Payer: MEDICARE

## 2024-10-23 VITALS
BODY MASS INDEX: 28.14 KG/M2 | SYSTOLIC BLOOD PRESSURE: 145 MMHG | HEART RATE: 52 BPM | RESPIRATION RATE: 16 BRPM | DIASTOLIC BLOOD PRESSURE: 56 MMHG | HEIGHT: 69 IN | WEIGHT: 190 LBS

## 2024-10-23 DIAGNOSIS — I10 ESSENTIAL (PRIMARY) HYPERTENSION: ICD-10-CM

## 2024-10-23 DIAGNOSIS — I63.10 CEREBRAL INFARCTION DUE TO EMBOLISM OF UNSPECIFIED PRECEREBRAL ARTERY: ICD-10-CM

## 2024-10-23 DIAGNOSIS — I48.0 PAROXYSMAL ATRIAL FIBRILLATION: ICD-10-CM

## 2024-10-23 PROCEDURE — 93000 ELECTROCARDIOGRAM COMPLETE: CPT

## 2024-10-23 PROCEDURE — G2211 COMPLEX E/M VISIT ADD ON: CPT

## 2024-10-23 PROCEDURE — 99214 OFFICE O/P EST MOD 30 MIN: CPT

## 2024-11-08 ENCOUNTER — NON-APPOINTMENT (OUTPATIENT)
Age: 79
End: 2024-11-08

## 2024-11-08 ENCOUNTER — APPOINTMENT (OUTPATIENT)
Dept: ELECTROPHYSIOLOGY | Facility: CLINIC | Age: 79
End: 2024-11-08
Payer: MEDICARE

## 2024-11-08 PROCEDURE — 93298 REM INTERROG DEV EVAL SCRMS: CPT

## 2024-11-26 ENCOUNTER — APPOINTMENT (OUTPATIENT)
Dept: ELECTROPHYSIOLOGY | Facility: CLINIC | Age: 79
End: 2024-11-26

## 2024-11-26 DIAGNOSIS — I48.0 PAROXYSMAL ATRIAL FIBRILLATION: ICD-10-CM

## 2024-11-26 DIAGNOSIS — Z95.818 PRESENCE OF OTHER CARDIAC IMPLANTS AND GRAFTS: ICD-10-CM

## 2024-11-26 DIAGNOSIS — I63.9 CEREBRAL INFARCTION, UNSPECIFIED: ICD-10-CM

## 2024-12-27 ENCOUNTER — APPOINTMENT (OUTPATIENT)
Dept: ELECTROPHYSIOLOGY | Facility: CLINIC | Age: 79
End: 2024-12-27

## 2024-12-27 ENCOUNTER — NON-APPOINTMENT (OUTPATIENT)
Age: 79
End: 2024-12-27

## 2024-12-27 PROCEDURE — 93298 REM INTERROG DEV EVAL SCRMS: CPT

## 2025-01-31 ENCOUNTER — APPOINTMENT (OUTPATIENT)
Dept: ELECTROPHYSIOLOGY | Facility: CLINIC | Age: 80
End: 2025-01-31
Payer: MEDICARE

## 2025-01-31 ENCOUNTER — NON-APPOINTMENT (OUTPATIENT)
Age: 80
End: 2025-01-31

## 2025-01-31 PROCEDURE — 93298 REM INTERROG DEV EVAL SCRMS: CPT

## 2025-03-07 ENCOUNTER — APPOINTMENT (OUTPATIENT)
Dept: ELECTROPHYSIOLOGY | Facility: CLINIC | Age: 80
End: 2025-03-07
Payer: MEDICARE

## 2025-03-07 ENCOUNTER — NON-APPOINTMENT (OUTPATIENT)
Age: 80
End: 2025-03-07

## 2025-03-07 PROCEDURE — 93298 REM INTERROG DEV EVAL SCRMS: CPT

## 2025-03-12 ENCOUNTER — NON-APPOINTMENT (OUTPATIENT)
Age: 80
End: 2025-03-12

## 2025-03-12 ENCOUNTER — APPOINTMENT (OUTPATIENT)
Dept: CARDIOLOGY | Facility: CLINIC | Age: 80
End: 2025-03-12
Payer: MEDICARE

## 2025-03-12 VITALS
RESPIRATION RATE: 16 BRPM | SYSTOLIC BLOOD PRESSURE: 150 MMHG | HEIGHT: 69 IN | WEIGHT: 189 LBS | HEART RATE: 53 BPM | DIASTOLIC BLOOD PRESSURE: 69 MMHG | BODY MASS INDEX: 27.99 KG/M2

## 2025-03-12 DIAGNOSIS — I48.0 PAROXYSMAL ATRIAL FIBRILLATION: ICD-10-CM

## 2025-03-12 DIAGNOSIS — I10 ESSENTIAL (PRIMARY) HYPERTENSION: ICD-10-CM

## 2025-03-12 DIAGNOSIS — I63.10 CEREBRAL INFARCTION DUE TO EMBOLISM OF UNSPECIFIED PRECEREBRAL ARTERY: ICD-10-CM

## 2025-03-12 PROCEDURE — 99214 OFFICE O/P EST MOD 30 MIN: CPT

## 2025-03-12 PROCEDURE — 93000 ELECTROCARDIOGRAM COMPLETE: CPT

## 2025-03-24 ENCOUNTER — NON-APPOINTMENT (OUTPATIENT)
Age: 80
End: 2025-03-24

## 2025-03-31 ENCOUNTER — NON-APPOINTMENT (OUTPATIENT)
Age: 80
End: 2025-03-31

## 2025-04-11 ENCOUNTER — APPOINTMENT (OUTPATIENT)
Dept: ELECTROPHYSIOLOGY | Facility: CLINIC | Age: 80
End: 2025-04-11
Payer: MEDICARE

## 2025-04-11 ENCOUNTER — NON-APPOINTMENT (OUTPATIENT)
Age: 80
End: 2025-04-11

## 2025-04-11 PROCEDURE — 93298 REM INTERROG DEV EVAL SCRMS: CPT

## 2025-04-21 ENCOUNTER — APPOINTMENT (OUTPATIENT)
Dept: PULMONOLOGY | Facility: CLINIC | Age: 80
End: 2025-04-21
Payer: MEDICARE

## 2025-04-21 VITALS — HEIGHT: 69 IN | BODY MASS INDEX: 28.14 KG/M2 | WEIGHT: 190 LBS

## 2025-04-21 VITALS
DIASTOLIC BLOOD PRESSURE: 70 MMHG | RESPIRATION RATE: 16 BRPM | HEART RATE: 56 BPM | SYSTOLIC BLOOD PRESSURE: 130 MMHG | OXYGEN SATURATION: 98 %

## 2025-04-21 DIAGNOSIS — R91.1 SOLITARY PULMONARY NODULE: ICD-10-CM

## 2025-04-21 DIAGNOSIS — Z87.891 PERSONAL HISTORY OF NICOTINE DEPENDENCE: ICD-10-CM

## 2025-04-21 PROCEDURE — 99213 OFFICE O/P EST LOW 20 MIN: CPT

## 2025-04-23 ENCOUNTER — NON-APPOINTMENT (OUTPATIENT)
Age: 80
End: 2025-04-23

## 2025-06-17 ENCOUNTER — APPOINTMENT (OUTPATIENT)
Dept: ELECTROPHYSIOLOGY | Facility: CLINIC | Age: 80
End: 2025-06-17
Payer: MEDICARE

## 2025-06-17 VITALS
WEIGHT: 188 LBS | OXYGEN SATURATION: 100 % | DIASTOLIC BLOOD PRESSURE: 58 MMHG | BODY MASS INDEX: 27.85 KG/M2 | HEART RATE: 53 BPM | SYSTOLIC BLOOD PRESSURE: 142 MMHG | HEIGHT: 69 IN

## 2025-06-17 PROCEDURE — 99214 OFFICE O/P EST MOD 30 MIN: CPT

## 2025-06-17 PROCEDURE — 93000 ELECTROCARDIOGRAM COMPLETE: CPT | Mod: 59

## 2025-06-23 ENCOUNTER — APPOINTMENT (OUTPATIENT)
Dept: PULMONOLOGY | Facility: CLINIC | Age: 80
End: 2025-06-23
Payer: MEDICARE

## 2025-06-23 VITALS
RESPIRATION RATE: 16 BRPM | DIASTOLIC BLOOD PRESSURE: 68 MMHG | HEART RATE: 67 BPM | SYSTOLIC BLOOD PRESSURE: 120 MMHG | OXYGEN SATURATION: 98 %

## 2025-06-23 VITALS — BODY MASS INDEX: 28.14 KG/M2 | HEIGHT: 69 IN | WEIGHT: 190 LBS

## 2025-06-23 PROCEDURE — 99214 OFFICE O/P EST MOD 30 MIN: CPT

## 2025-06-30 ENCOUNTER — OUTPATIENT (OUTPATIENT)
Dept: OUTPATIENT SERVICES | Facility: HOSPITAL | Age: 80
LOS: 1 days | End: 2025-06-30
Payer: MEDICARE

## 2025-06-30 ENCOUNTER — TRANSCRIPTION ENCOUNTER (OUTPATIENT)
Age: 80
End: 2025-06-30

## 2025-06-30 VITALS
DIASTOLIC BLOOD PRESSURE: 53 MMHG | WEIGHT: 190.04 LBS | SYSTOLIC BLOOD PRESSURE: 124 MMHG | RESPIRATION RATE: 15 BRPM | OXYGEN SATURATION: 99 % | HEIGHT: 69 IN | HEART RATE: 47 BPM | TEMPERATURE: 98 F

## 2025-06-30 DIAGNOSIS — I48.0 PAROXYSMAL ATRIAL FIBRILLATION: ICD-10-CM

## 2025-06-30 DIAGNOSIS — Z95.818 PRESENCE OF OTHER CARDIAC IMPLANTS AND GRAFTS: Chronic | ICD-10-CM

## 2025-06-30 LAB
ABO RH CONFIRMATION: SIGNIFICANT CHANGE UP
ANION GAP SERPL CALC-SCNC: 10 MMOL/L — SIGNIFICANT CHANGE UP (ref 5–17)
APTT BLD: 33.3 SEC — SIGNIFICANT CHANGE UP (ref 26.1–36.8)
BLD GP AB SCN SERPL QL: SIGNIFICANT CHANGE UP
BUN SERPL-MCNC: 22.6 MG/DL — HIGH (ref 8–20)
CALCIUM SERPL-MCNC: 9.7 MG/DL — SIGNIFICANT CHANGE UP (ref 8.4–10.5)
CHLORIDE SERPL-SCNC: 104 MMOL/L — SIGNIFICANT CHANGE UP (ref 96–108)
CO2 SERPL-SCNC: 26 MMOL/L — SIGNIFICANT CHANGE UP (ref 22–29)
CREAT SERPL-MCNC: 0.92 MG/DL — SIGNIFICANT CHANGE UP (ref 0.5–1.3)
EGFR: 85 ML/MIN/1.73M2 — SIGNIFICANT CHANGE UP
EGFR: 85 ML/MIN/1.73M2 — SIGNIFICANT CHANGE UP
GLUCOSE SERPL-MCNC: 120 MG/DL — HIGH (ref 70–99)
HCT VFR BLD CALC: 40.2 % — SIGNIFICANT CHANGE UP (ref 39–50)
HGB BLD-MCNC: 13.5 G/DL — SIGNIFICANT CHANGE UP (ref 13–17)
INR BLD: 1.1 RATIO — SIGNIFICANT CHANGE UP (ref 0.85–1.16)
MCHC RBC-ENTMCNC: 30.3 PG — SIGNIFICANT CHANGE UP (ref 27–34)
MCHC RBC-ENTMCNC: 33.6 G/DL — SIGNIFICANT CHANGE UP (ref 32–36)
MCV RBC AUTO: 90.3 FL — SIGNIFICANT CHANGE UP (ref 80–100)
NRBC # BLD AUTO: 0 K/UL — SIGNIFICANT CHANGE UP (ref 0–0)
NRBC # FLD: 0 K/UL — SIGNIFICANT CHANGE UP (ref 0–0)
NRBC BLD AUTO-RTO: 0 /100 WBCS — SIGNIFICANT CHANGE UP (ref 0–0)
PLATELET # BLD AUTO: 221 K/UL — SIGNIFICANT CHANGE UP (ref 150–400)
PMV BLD: 9.5 FL — SIGNIFICANT CHANGE UP (ref 7–13)
POTASSIUM SERPL-MCNC: 4.3 MMOL/L — SIGNIFICANT CHANGE UP (ref 3.5–5.3)
POTASSIUM SERPL-SCNC: 4.3 MMOL/L — SIGNIFICANT CHANGE UP (ref 3.5–5.3)
PROTHROM AB SERPL-ACNC: 12.4 SEC — SIGNIFICANT CHANGE UP (ref 9.9–13.4)
RBC # BLD: 4.45 M/UL — SIGNIFICANT CHANGE UP (ref 4.2–5.8)
RBC # FLD: 13.5 % — SIGNIFICANT CHANGE UP (ref 10.3–14.5)
SODIUM SERPL-SCNC: 140 MMOL/L — SIGNIFICANT CHANGE UP (ref 135–145)
WBC # BLD: 4.93 K/UL — SIGNIFICANT CHANGE UP (ref 3.8–10.5)
WBC # FLD AUTO: 4.93 K/UL — SIGNIFICANT CHANGE UP (ref 3.8–10.5)

## 2025-06-30 PROCEDURE — 86850 RBC ANTIBODY SCREEN: CPT

## 2025-06-30 PROCEDURE — 86901 BLOOD TYPING SEROLOGIC RH(D): CPT

## 2025-06-30 PROCEDURE — 85610 PROTHROMBIN TIME: CPT

## 2025-06-30 PROCEDURE — 86900 BLOOD TYPING SEROLOGIC ABO: CPT

## 2025-06-30 PROCEDURE — 93005 ELECTROCARDIOGRAM TRACING: CPT

## 2025-06-30 PROCEDURE — 93010 ELECTROCARDIOGRAM REPORT: CPT | Mod: 77

## 2025-06-30 PROCEDURE — 36415 COLL VENOUS BLD VENIPUNCTURE: CPT

## 2025-06-30 PROCEDURE — 85027 COMPLETE CBC AUTOMATED: CPT

## 2025-06-30 PROCEDURE — 93657 TX L/R ATRIAL FIB ADDL: CPT

## 2025-06-30 PROCEDURE — 93010 ELECTROCARDIOGRAM REPORT: CPT

## 2025-06-30 PROCEDURE — 93656 COMPRE EP EVAL ABLTJ ATR FIB: CPT

## 2025-06-30 PROCEDURE — 80048 BASIC METABOLIC PNL TOTAL CA: CPT

## 2025-06-30 PROCEDURE — 85730 THROMBOPLASTIN TIME PARTIAL: CPT

## 2025-06-30 RX ORDER — LOSARTAN POTASSIUM 100 MG/1
100 TABLET, FILM COATED ORAL DAILY
Refills: 0 | Status: DISCONTINUED | OUTPATIENT
Start: 2025-06-30 | End: 2025-07-14

## 2025-06-30 RX ORDER — ACETAMINOPHEN 500 MG/5ML
975 LIQUID (ML) ORAL EVERY 6 HOURS
Refills: 0 | Status: DISCONTINUED | OUTPATIENT
Start: 2025-06-30 | End: 2025-07-14

## 2025-06-30 RX ORDER — OXYCODONE HYDROCHLORIDE 30 MG/1
5 TABLET ORAL EVERY 6 HOURS
Refills: 0 | Status: DISCONTINUED | OUTPATIENT
Start: 2025-06-30 | End: 2025-06-30

## 2025-06-30 RX ORDER — ALPRAZOLAM 0.5 MG
0.25 TABLET, EXTENDED RELEASE 24 HR ORAL EVERY 8 HOURS
Refills: 0 | Status: DISCONTINUED | OUTPATIENT
Start: 2025-06-30 | End: 2025-06-30

## 2025-06-30 RX ORDER — ONDANSETRON HCL/PF 4 MG/2 ML
4 VIAL (ML) INJECTION EVERY 6 HOURS
Refills: 0 | Status: DISCONTINUED | OUTPATIENT
Start: 2025-06-30 | End: 2025-07-14

## 2025-06-30 RX ORDER — APIXABAN 2.5 MG/1
1 TABLET, FILM COATED ORAL
Refills: 0 | DISCHARGE

## 2025-06-30 RX ORDER — MAGNESIUM, ALUMINUM HYDROXIDE 200-200 MG
30 TABLET,CHEWABLE ORAL EVERY 6 HOURS
Refills: 0 | Status: DISCONTINUED | OUTPATIENT
Start: 2025-06-30 | End: 2025-07-14

## 2025-06-30 RX ORDER — APIXABAN 2.5 MG/1
5 TABLET, FILM COATED ORAL
Refills: 0 | Status: DISCONTINUED | OUTPATIENT
Start: 2025-06-30 | End: 2025-07-14

## 2025-06-30 RX ADMIN — APIXABAN 5 MILLIGRAM(S): 2.5 TABLET, FILM COATED ORAL at 18:52

## 2025-06-30 RX ADMIN — Medication 1 LOZENGE: at 21:21

## 2025-06-30 RX ADMIN — Medication 1 LOZENGE: at 23:43

## 2025-06-30 RX ADMIN — Medication 50 MILLILITER(S): at 14:10

## 2025-06-30 NOTE — H&P PST ADULT - HISTORY OF PRESENT ILLNESS
Pt is a 78-year-old male with HTN, GERD, Vertigo, Stroke (MCA clot), s/p MDT ILR implantation on 24 and Paroxysmal A.fib on Eliquis.  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  Pt did have aphasia (occlusive M2 segment Left MCA clot at the inferior division) which has subsequently resolved and upon out-pt follow ILR revealed 3.1% A.fib burden.  Pt had a TTE in 2024 which revealed a preserved EF and NST in 2024 which revealed no evidence of ischemia or infarct.  Pt now presents electively for A.fib ablation with Dr Pantoja.  Since his last office appointment pt does report he had an episode of Vertigo on  for which he performed a Epley maneuver and it has since resolved.  ILR interrogated and pt has had no recent episodes of AF or arrythmias that correlate to time of pt's symptoms (last episode on 2025).  Pt reports that that he does occasionally miss his evening dose of Eliquis do to forgetting to take it and falling asleep.  Pt educated on importance of strict compliance post-op of AC regiment.  Last dose of Eliquis was last night at 19:00.  NPO confirmed.  ?  Cardiology Summary        EC25: sinus darci 46 bpm      Stress Test: Nuclear stress test August 15, 2024 during which the patient exercised a Everton protocol for 7 minutes, totaling 8 METS. EKG showed ST depressions with exercise. Nuclear imaging showed no evidence of ischemia or infarct and ejection fraction of 68%.      Echo: 2024- LVEF 60%, NML LV/RV, mild MR   Pt is a 79-year-old male with HTN, GERD, Vertigo, Stroke (MCA clot), s/p MDT ILR implantation on 24 and Paroxysmal A.fib on Eliquis.  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  Pt did have aphasia (occlusive M2 segment Left MCA clot at the inferior division) which has subsequently resolved and upon out-pt follow ILR revealed 3.1% A.fib burden.  Pt had a TTE in 2024 which revealed a preserved EF and NST in 2024 which revealed no evidence of ischemia or infarct.  Pt now presents electively for A.fib ablation with Dr Pantoja.  Since his last office appointment pt does report he had an episode of Vertigo on  for which he performed a Epley maneuver and it has since resolved.  ILR interrogated and pt has had no recent episodes of AF or arrythmias that correlate to time of pt's symptoms (last episode on 2025).  Pt reports that that he does occasionally miss his evening dose of Eliquis do to forgetting to take it and falling asleep.  Pt educated on importance of strict compliance post-op of AC regiment.  Last dose of Eliquis was last night at 19:00.  NPO confirmed.  ?  Cardiology Summary        EC25: sinus darci 46 bpm      Stress Test: Nuclear stress test August 15, 2024 during which the patient exercised a Everton protocol for 7 minutes, totaling 8 METS. EKG showed ST depressions with exercise. Nuclear imaging showed no evidence of ischemia or infarct and ejection fraction of 68%.      Echo: 2024- LVEF 60%, NML LV/RV, mild MR

## 2025-06-30 NOTE — H&P PST ADULT - CIGARETTE, PACK YRS
Alert-The patient is alert, awake and responds to voice. The patient is oriented to time, place, and person. The triage nurse is able to obtain subjective information.
40

## 2025-06-30 NOTE — ASU PATIENT PROFILE, ADULT - FALL HARM RISK - RISK INTERVENTIONS

## 2025-06-30 NOTE — H&P PST ADULT - NSICDXPASTMEDICALHX_GEN_ALL_CORE_FT
PAST MEDICAL HISTORY:  Cerebrovascular accident (CVA)     GERD (gastroesophageal reflux disease)     HTN (hypertension)     Paroxysmal atrial fibrillation     Vertigo

## 2025-06-30 NOTE — DISCHARGE NOTE PROVIDER - HOSPITAL COURSE
Pt is a 79-year-old male with HTN, GERD, Vertigo, Stroke (MCA clot), s/p MDT ILR implantation on 4/25/24 and Paroxysmal A.fib on Eliquis.  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  Pt did have aphasia (occlusive M2 segment Left MCA clot at the inferior division) which has subsequently resolved and upon out-pt follow ILR revealed 3.1% A.fib burden.  Pt had a TTE in 4/2024 which revealed a preserved EF and NST in 8/2024 which revealed no evidence of ischemia or infarct.  Pt presented electively for and is now s/p FARAPULSE pulsed field ablation of atrial fibrillation (PVI, posterior wall) with access obtained via b/l femoral veins and hemostasis achieved with figure of 8 sutures.

## 2025-06-30 NOTE — H&P PST ADULT - ASSESSMENT
Pt is a 78-year-old male with HTN, GERD, Vertigo, Stroke (MCA clot), s/p MDT ILR implantation on 4/25/24 and Paroxysmal A.fib on Eliquis.  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  Pt did have aphasia (occlusive M2 segment Left MCA clot at the inferior division) which has subsequently resolved and upon out-pt follow ILR revealed 3.1% A.fib burden.  Pt had a TTE in 4/2024 which revealed a preserved EF and NST in 8/2024 which revealed no evidence of ischemia or infarct.  Pt now presents electively for A.fib ablation with Dr Pantoja.  Since his last office appointment pt does report he had an episode of Vertigo on 6/19 for which he performed a Epley maneuver and it has since resolved.  ILR interrogated and pt has had no recent episodes of AF or arrythmias that correlate to time of pt's symptoms (last episode on 6/9/2025).  Pt reports that that he does occasionally miss his evening dose of Eliquis do to forgetting to take it and falling asleep.  Pt educated on importance of strict compliance post-op of AC regiment.  Last dose of Eliquis was last night at 19:00.  NPO confirmed.    Plan  -Stat labs and ECG  -Consent with Attending  Pt is a 79-year-old male with HTN, GERD, Vertigo, Stroke (MCA clot), s/p MDT ILR implantation on 4/25/24 and Paroxysmal A.fib on Eliquis.  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  Pt did have aphasia (occlusive M2 segment Left MCA clot at the inferior division) which has subsequently resolved and upon out-pt follow ILR revealed 3.1% A.fib burden.  Pt had a TTE in 4/2024 which revealed a preserved EF and NST in 8/2024 which revealed no evidence of ischemia or infarct.  Pt now presents electively for A.fib ablation with Dr Pantoja.  Since his last office appointment pt does report he had an episode of Vertigo on 6/19 for which he performed a Epley maneuver and it has since resolved.  ILR interrogated and pt has had no recent episodes of AF or arrythmias that correlate to time of pt's symptoms (last episode on 6/9/2025).  Pt reports that that he does occasionally miss his evening dose of Eliquis do to forgetting to take it and falling asleep.  Pt educated on importance of strict compliance post-op of AC regiment.  Last dose of Eliquis was last night at 19:00.  NPO confirmed.    Plan  -Stat labs and ECG  -Consent with Attending

## 2025-06-30 NOTE — DISCHARGE NOTE PROVIDER - NSDCMRMEDTOKEN_GEN_ALL_CORE_FT
Eliquis 5 mg oral tablet: 1 tab(s) orally 2 times a day  losartan 100 mg oral tablet: 1 tab(s) orally once a day  omeprazole 20 mg oral delayed release capsule: 1 cap(s) orally once a day  Outpatient speech therapy: Outpatient speech therapy for expressive aphasia s/p stroke (ICD 10  F80.1)   Eliquis 5 mg oral tablet: 1 tab(s) orally 2 times a day  losartan 100 mg oral tablet: 1 tab(s) orally once a day  omeprazole 20 mg oral delayed release capsule: 1 cap(s) orally once a day

## 2025-06-30 NOTE — PROGRESS NOTE ADULT - SUBJECTIVE AND OBJECTIVE BOX
Admission Criteria  Please admit the patient to the following service: CARDIOLOGY    - Uncontrolled diabetes  Major Criteria:    - Continuous EKG monitoring is required for condition causing arrhythmia (hyperkalemia, etc)    - Significant volume load > 200 ml      Admit to: 4BKT     Patient is being admitted to the inpatient service due to high risk characteristics and need for further management/monitoring and is considered to be at a significantly increased risk of major adverse cardiac and vascular events if discharged.

## 2025-06-30 NOTE — PROGRESS NOTE ADULT - SUBJECTIVE AND OBJECTIVE BOX
PROCEDURE(S): FARAPULSE Pulsed Field Ablation of Atrial Fibrillation    ELECTROPHYSIOLOGIST(S): Cameron Pantoja MD         COMPLICATIONS:  none        DISPOSITION: observation      CONDITION: stable    Pt doing well s/p FARAPULSE pulsed field ablation of atrial fibrillation (PVI, posterior wall) with access obtained via b/l femoral veins and hemostasis achieved with figure of 8 sutures. Denies complaint post procedure.     I/Os: net +    MEDICATIONS  (STANDING):  apixaban 5 milliGRAM(s) Oral <User Schedule>  losartan 100 milliGRAM(s) Oral daily  pantoprazole    Tablet 40 milliGRAM(s) Oral before breakfast  sodium chloride 0.9%. 1000 milliLiter(s) (50 mL/Hr) IV Continuous <Continuous>    MEDICATIONS  (PRN):  acetaminophen     Tablet .. 975 milliGRAM(s) Oral every 6 hours PRN Mild Pain (1 - 3)  ALPRAZolam 0.25 milliGRAM(s) Oral every 8 hours PRN anxiety  aluminum hydroxide/magnesium hydroxide/simethicone Suspension 30 milliLiter(s) Oral every 6 hours PRN Dyspepsia  benzocaine/menthol Lozenge 1 Lozenge Oral every 8 hours PRN Sore Throat  ondansetron Injectable 4 milliGRAM(s) IV Push every 6 hours PRN Nausea and/or Vomiting  oxyCODONE    IR 5 milliGRAM(s) Oral every 6 hours PRN Moderate Pain (4 - 6)      Allergies  No Known Allergies          T(C): 36.6 (06-30-25 @ 09:45), Max: 36.6 (06-30-25 @ 09:45)  HR: 45 (06-30-25 @ 09:47) (45 - 47)  BP: 124/54 (06-30-25 @ 09:47) (124/53 - 124/54)  RR: 18 (06-30-25 @ 09:47) (15 - 18)  SpO2: 96% (06-30-25 @ 09:47) (96% - 99%)      Physical exam:   awake, alert, no obvious distress   Card: S1/S2, RRR, no m/g/r  Resp: lungs CTA b/l  Abd: S/NT/ND  Groins: hemostatic sutures in place; sites C/D/I; no bleeding, hematoma, erythema, exudate or edema  Ext: no edema; distal pulses intact  Skin: warm, dry, no rash     ECG: Pending      Assessment:   Pt is a 79-year-old male with HTN, GERD, Vertigo, Stroke (MCA clot), s/p MDT ILR implantation on 4/25/24 and Paroxysmal A.fib on Eliquis.  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  Pt did have aphasia (occlusive M2 segment Left MCA clot at the inferior division) which has subsequently resolved and upon out-pt follow ILR revealed 3.1% A.fib burden.  Pt had a TTE in 4/2024 which revealed a preserved EF and NST in 8/2024 which revealed no evidence of ischemia or infarct.  Pt now presents electively for and is now s/p FARAPULSE pulsed field ablation of atrial fibrillation (PVI, posterior wall) with access obtained via b/l femoral veins and hemostasis achieved with figure of 8 sutures.      Plan:   Bedrest x 4 hours, then OOB with assistance and progress as tolerated.   Groin sutures to be removed by EP service in AM.   Pending groin status, resume Eliquis 5mg bid  DO NOT HOLD, INTERRUPT OR REVERSE ANTICOAGULATION WITHOUT EXPLICIT APPROVAL FROM EP SERVICE.   Continue other home medications.   Gentle hydration  Strict I/Os.  Please encourage incentive spirometry and ambulation once able.  Observation and monitoring on telemetry overnight with anticipated discharge in the AM and outpt follow up in 2-4 weeks.   PROCEDURE(S): FARAPULSE Pulsed Field Ablation of Atrial Fibrillation    ELECTROPHYSIOLOGIST(S): Cameron Pantoja MD         COMPLICATIONS:  none        DISPOSITION: observation      CONDITION: stable    Pt doing well s/p FARAPULSE pulsed field ablation of atrial fibrillation (PVI, posterior wall) with access obtained via b/l femoral veins and hemostasis achieved with figure of 8 sutures. Denies complaint post procedure.     I/Os: net +    MEDICATIONS  (STANDING):  apixaban 5 milliGRAM(s) Oral <User Schedule>  losartan 100 milliGRAM(s) Oral daily  pantoprazole    Tablet 40 milliGRAM(s) Oral before breakfast  sodium chloride 0.9%. 1000 milliLiter(s) (50 mL/Hr) IV Continuous <Continuous>    MEDICATIONS  (PRN):  acetaminophen     Tablet .. 975 milliGRAM(s) Oral every 6 hours PRN Mild Pain (1 - 3)  ALPRAZolam 0.25 milliGRAM(s) Oral every 8 hours PRN anxiety  aluminum hydroxide/magnesium hydroxide/simethicone Suspension 30 milliLiter(s) Oral every 6 hours PRN Dyspepsia  benzocaine/menthol Lozenge 1 Lozenge Oral every 8 hours PRN Sore Throat  ondansetron Injectable 4 milliGRAM(s) IV Push every 6 hours PRN Nausea and/or Vomiting  oxyCODONE    IR 5 milliGRAM(s) Oral every 6 hours PRN Moderate Pain (4 - 6)      Allergies  No Known Allergies          T(C): 36.6 (06-30-25 @ 09:45), Max: 36.6 (06-30-25 @ 09:45)  HR: 45 (06-30-25 @ 09:47) (45 - 47)  BP: 124/54 (06-30-25 @ 09:47) (124/53 - 124/54)  RR: 18 (06-30-25 @ 09:47) (15 - 18)  SpO2: 96% (06-30-25 @ 09:47) (96% - 99%)      Physical exam:   awake, alert, no obvious distress   Card: S1/S2, RRR, no m/g/r  Resp: lungs CTA b/l  Abd: S/NT/ND  Groins: hemostatic sutures in place; sites C/D/I; no bleeding, hematoma, erythema, exudate or edema  Ext: no edema; distal pulses intact  Skin: warm, dry, no rash     Post-procedure VS:  HR 54bpm  /61mmHg  SpO2 98% RA    ECG: SR 51bpm, JUANCARLOS 220ms, QRSD 90ms      Assessment:   Pt is a 79-year-old male with HTN, GERD, Vertigo, Stroke (MCA clot), s/p MDT ILR implantation on 4/25/24 and Paroxysmal A.fib on Eliquis.  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  Pt did have aphasia (occlusive M2 segment Left MCA clot at the inferior division) which has subsequently resolved and upon out-pt follow ILR revealed 3.1% A.fib burden.  Pt had a TTE in 4/2024 which revealed a preserved EF and NST in 8/2024 which revealed no evidence of ischemia or infarct.  Pt now presents electively for and is now s/p FARAPULSE pulsed field ablation of atrial fibrillation (PVI, posterior wall) with access obtained via b/l femoral veins and hemostasis achieved with figure of 8 sutures.      Plan:   Bedrest x 4 hours, then OOB with assistance and progress as tolerated.   Groin sutures to be removed by EP service in AM.   Pending groin status, resume Eliquis 5mg bid  DO NOT HOLD, INTERRUPT OR REVERSE ANTICOAGULATION WITHOUT EXPLICIT APPROVAL FROM EP SERVICE.   Continue other home medications.   Gentle hydration  Strict I/Os.  Please encourage incentive spirometry and ambulation once able.  Observation and monitoring on telemetry overnight with anticipated discharge in the AM and outpt follow up in 2-4 weeks.

## 2025-06-30 NOTE — DISCHARGE NOTE PROVIDER - CARE PROVIDER_API CALL
Cameron Pantoja  Clinical Cardiac Electrophysiology  [address], [address]  [place] [address]  Phone: [phone]  Fax: [phone]  Follow Up Time:

## 2025-06-30 NOTE — DISCHARGE NOTE PROVIDER - NSDCCPTREATMENT_GEN_ALL_CORE_FT
PRINCIPAL PROCEDURE  Procedure: Pulsed field ablation, cardiac tissue  Findings and Treatment: - Bruising at the groin, sometimes extending down the leg, and/or a small lump under the skin at the groin access site is normal and will resolve within 2 – 3 weeks.   - Occasional skipped beats or palpitations that last for a few beats are common and generally resolve within 1-2 months.   - You may walk and take stairs at a regular pace.   - Do not perform any exercise more strenuous than walking for 1 week.   - Do not strain or lift heavy objects for 1 week.  - You may shower the day after the procedure.  - Do not soak in water (such as tub baths, hot tubs, swimming, etc.) for 1 week.   - You may resume all other activities the day after the procedure.  Call your doctor if:   - you notice bleeding, redness, drainage, swelling, increased tenderness or a hot sensation around the catheter insertion site.   - your temperature is greater than 100 degrees F for more than 24 hours.  - your rapid heart rhythm returns.  - you have any questions or concerns regarding the procedure.  If significant bleeding and/or a large lump (the size of a golf ball or bigger) occurs:  - Lie flat and apply continuous direct pressure just above the puncture site for at least 10 minutes  - If the issue resolves, notify your physician immediately.    - If the bleeding cannot be controlled, please seek immediate medical attention.  If you experience increased difficulty breathing or chest pain, or if you faint or have dizzy spells, please seek immediate medical attention.

## 2025-06-30 NOTE — DISCHARGE NOTE PROVIDER - NSDCFUSCHEDAPPT_GEN_ALL_CORE_FT
Clarke Hodge  [facility] Physician [facility]  CARDIOLOGY [address] [place]  Scheduled Appointment: 07/07/2025    [facility] Physician [facility]  ELECTROPH [address]  Scheduled Appointment: 07/22/2025

## 2025-06-30 NOTE — DISCHARGE NOTE PROVIDER - NSDCFUADDINST_GEN_ALL_CORE_FT
Follow up with the [facility] Electrophysiology team in 4-6 weeks. Our office will contact you in 3-5 days to schedule this appointment. Please call [phone] with questions or concerns.

## 2025-07-01 ENCOUNTER — TRANSCRIPTION ENCOUNTER (OUTPATIENT)
Age: 80
End: 2025-07-01

## 2025-07-01 VITALS — DIASTOLIC BLOOD PRESSURE: 66 MMHG | SYSTOLIC BLOOD PRESSURE: 138 MMHG

## 2025-07-01 LAB
ANION GAP SERPL CALC-SCNC: 10 MMOL/L — SIGNIFICANT CHANGE UP (ref 5–17)
BUN SERPL-MCNC: 22.9 MG/DL — HIGH (ref 8–20)
CALCIUM SERPL-MCNC: 8.6 MG/DL — SIGNIFICANT CHANGE UP (ref 8.4–10.5)
CHLORIDE SERPL-SCNC: 105 MMOL/L — SIGNIFICANT CHANGE UP (ref 96–108)
CO2 SERPL-SCNC: 23 MMOL/L — SIGNIFICANT CHANGE UP (ref 22–29)
CREAT SERPL-MCNC: 0.9 MG/DL — SIGNIFICANT CHANGE UP (ref 0.5–1.3)
EGFR: 87 ML/MIN/1.73M2 — SIGNIFICANT CHANGE UP
EGFR: 87 ML/MIN/1.73M2 — SIGNIFICANT CHANGE UP
GLUCOSE SERPL-MCNC: 169 MG/DL — HIGH (ref 70–99)
HCT VFR BLD CALC: 35.8 % — LOW (ref 39–50)
HGB BLD-MCNC: 12.1 G/DL — LOW (ref 13–17)
MAGNESIUM SERPL-MCNC: 1.9 MG/DL — SIGNIFICANT CHANGE UP (ref 1.6–2.6)
MCHC RBC-ENTMCNC: 30.6 PG — SIGNIFICANT CHANGE UP (ref 27–34)
MCHC RBC-ENTMCNC: 33.8 G/DL — SIGNIFICANT CHANGE UP (ref 32–36)
MCV RBC AUTO: 90.6 FL — SIGNIFICANT CHANGE UP (ref 80–100)
NRBC # BLD AUTO: 0 K/UL — SIGNIFICANT CHANGE UP (ref 0–0)
NRBC # FLD: 0 K/UL — SIGNIFICANT CHANGE UP (ref 0–0)
NRBC BLD AUTO-RTO: 0 /100 WBCS — SIGNIFICANT CHANGE UP (ref 0–0)
PLATELET # BLD AUTO: 199 K/UL — SIGNIFICANT CHANGE UP (ref 150–400)
PMV BLD: 10 FL — SIGNIFICANT CHANGE UP (ref 7–13)
POTASSIUM SERPL-MCNC: 4.2 MMOL/L — SIGNIFICANT CHANGE UP (ref 3.5–5.3)
POTASSIUM SERPL-SCNC: 4.2 MMOL/L — SIGNIFICANT CHANGE UP (ref 3.5–5.3)
RBC # BLD: 3.95 M/UL — LOW (ref 4.2–5.8)
RBC # FLD: 14 % — SIGNIFICANT CHANGE UP (ref 10.3–14.5)
SODIUM SERPL-SCNC: 138 MMOL/L — SIGNIFICANT CHANGE UP (ref 135–145)
WBC # BLD: 10.32 K/UL — SIGNIFICANT CHANGE UP (ref 3.8–10.5)
WBC # FLD AUTO: 10.32 K/UL — SIGNIFICANT CHANGE UP (ref 3.8–10.5)

## 2025-07-01 PROCEDURE — C1766: CPT

## 2025-07-01 PROCEDURE — C1732: CPT

## 2025-07-01 PROCEDURE — 85730 THROMBOPLASTIN TIME PARTIAL: CPT

## 2025-07-01 PROCEDURE — 93010 ELECTROCARDIOGRAM REPORT: CPT

## 2025-07-01 PROCEDURE — C1769: CPT

## 2025-07-01 PROCEDURE — 36415 COLL VENOUS BLD VENIPUNCTURE: CPT

## 2025-07-01 PROCEDURE — 85610 PROTHROMBIN TIME: CPT

## 2025-07-01 PROCEDURE — C1759: CPT

## 2025-07-01 PROCEDURE — C1730: CPT

## 2025-07-01 PROCEDURE — 83735 ASSAY OF MAGNESIUM: CPT

## 2025-07-01 PROCEDURE — 93005 ELECTROCARDIOGRAM TRACING: CPT

## 2025-07-01 PROCEDURE — 93657 TX L/R ATRIAL FIB ADDL: CPT

## 2025-07-01 PROCEDURE — C1733: CPT

## 2025-07-01 PROCEDURE — 93656 COMPRE EP EVAL ABLTJ ATR FIB: CPT

## 2025-07-01 PROCEDURE — 86850 RBC ANTIBODY SCREEN: CPT

## 2025-07-01 PROCEDURE — 86900 BLOOD TYPING SEROLOGIC ABO: CPT

## 2025-07-01 PROCEDURE — 86901 BLOOD TYPING SEROLOGIC RH(D): CPT

## 2025-07-01 PROCEDURE — 85027 COMPLETE CBC AUTOMATED: CPT

## 2025-07-01 PROCEDURE — 80048 BASIC METABOLIC PNL TOTAL CA: CPT

## 2025-07-01 PROCEDURE — C1894: CPT

## 2025-07-01 PROCEDURE — C1893: CPT

## 2025-07-01 RX ADMIN — Medication 40 MILLIGRAM(S): at 05:24

## 2025-07-01 RX ADMIN — Medication 1 LOZENGE: at 05:24

## 2025-07-01 RX ADMIN — LOSARTAN POTASSIUM 100 MILLIGRAM(S): 100 TABLET, FILM COATED ORAL at 05:24

## 2025-07-01 RX ADMIN — APIXABAN 5 MILLIGRAM(S): 2.5 TABLET, FILM COATED ORAL at 05:24

## 2025-07-01 NOTE — PROGRESS NOTE ADULT - SUBJECTIVE AND OBJECTIVE BOX
Patient seen today in bed. Figure 8 sutures removed from right and left groin without complication. No overnight events    EKG: SR  TELE: SR. No other events.     MEDICATIONS  (STANDING):  apixaban 5 milliGRAM(s) Oral <User Schedule>  benzocaine/menthol Lozenge 1 Lozenge Oral every 2 hours  losartan 100 milliGRAM(s) Oral daily  pantoprazole    Tablet 40 milliGRAM(s) Oral before breakfast  sodium chloride 0.9%. 1000 milliLiter(s) (50 mL/Hr) IV Continuous <Continuous>    MEDICATIONS  (PRN):  acetaminophen     Tablet .. 975 milliGRAM(s) Oral every 6 hours PRN Mild Pain (1 - 3)  ALPRAZolam 0.25 milliGRAM(s) Oral every 8 hours PRN anxiety  aluminum hydroxide/magnesium hydroxide/simethicone Suspension 30 milliLiter(s) Oral every 6 hours PRN Dyspepsia  ondansetron Injectable 4 milliGRAM(s) IV Push every 6 hours PRN Nausea and/or Vomiting  oxyCODONE    IR 5 milliGRAM(s) Oral every 6 hours PRN Moderate Pain (4 - 6)    Allergies  No Known Allergies    PAST MEDICAL & SURGICAL HISTORY:  HTN (hypertension)  GERD (gastroesophageal reflux disease)  Cerebrovascular accident (CVA)  Paroxysmal atrial fibrillation  Vertigo  Implantable loop recorder present    Vital Signs Last 24 Hrs  T(C): 36.4 (01 Jul 2025 08:10), Max: 36.6 (30 Jun 2025 09:45)  T(F): 97.5 (01 Jul 2025 08:10), Max: 97.9 (30 Jun 2025 09:45)  HR: 64 (01 Jul 2025 08:10) (45 - 68)  BP: 132/60 (01 Jul 2025 08:10) (124/53 - 155/77)  BP(mean): 84 (01 Jul 2025 05:00) (77 - 84)  RR: 18 (01 Jul 2025 08:10) (10 - 18)  SpO2: 95% (01 Jul 2025 08:10) (94% - 100%)    Physical Exam:  Constitutional: NAD, AAOx3  Cardiovascular: +S1S2 RRR  Pulmonary: CTA b/l, unlabored  GI: soft NTND +BS  Extremities: no pedal edema,   Right and left groin: no hematoma or ecchymosis noted.   Neuro: non focal, JOLLEY x4    LABS:                        12.1   10.32 )-----------( 199      ( 01 Jul 2025 05:50 )             35.8     138  |  105  |  22.9[H]  ----------------------------<  169[H]  4.2   |  23.0  |  0.90  Ca    8.6      01 Jul 2025 05:50  Mg     1.9     07-01  PT/INR - ( 30 Jun 2025 08:44 )   PT: 12.4 sec;   INR: 1.10 ratio    PTT - ( 30 Jun 2025 08:44 )  PTT:33.3 sec    A/P  79 year old male with HTN, GERD, Vertigo, Stroke (MCA clot), s/p MDT ILR implantation on 4/25/24 and Paroxysmal A.fib on Eliquis who is now POD#1 s/p FARAPULSE pulsed field ablation of atrial fibrillation (PVI, posterior wall) with access obtained via b/l femoral veins    - Discharge home today.    Patient seen today in bed. Figure 8 sutures removed from right and left groin without complication. No overnight events    EKG: SR at 63bpm; qRSD 92ms  TELE: SR. No other events.     MEDICATIONS  (STANDING):  apixaban 5 milliGRAM(s) Oral <User Schedule>  benzocaine/menthol Lozenge 1 Lozenge Oral every 2 hours  losartan 100 milliGRAM(s) Oral daily  pantoprazole    Tablet 40 milliGRAM(s) Oral before breakfast  sodium chloride 0.9%. 1000 milliLiter(s) (50 mL/Hr) IV Continuous <Continuous>    MEDICATIONS  (PRN):  acetaminophen     Tablet .. 975 milliGRAM(s) Oral every 6 hours PRN Mild Pain (1 - 3)  ALPRAZolam 0.25 milliGRAM(s) Oral every 8 hours PRN anxiety  aluminum hydroxide/magnesium hydroxide/simethicone Suspension 30 milliLiter(s) Oral every 6 hours PRN Dyspepsia  ondansetron Injectable 4 milliGRAM(s) IV Push every 6 hours PRN Nausea and/or Vomiting  oxyCODONE    IR 5 milliGRAM(s) Oral every 6 hours PRN Moderate Pain (4 - 6)    Allergies  No Known Allergies    PAST MEDICAL & SURGICAL HISTORY:  HTN (hypertension)  GERD (gastroesophageal reflux disease)  Cerebrovascular accident (CVA)  Paroxysmal atrial fibrillation  Vertigo  Implantable loop recorder present    Vital Signs Last 24 Hrs  T(C): 36.4 (01 Jul 2025 08:10), Max: 36.6 (30 Jun 2025 09:45)  T(F): 97.5 (01 Jul 2025 08:10), Max: 97.9 (30 Jun 2025 09:45)  HR: 64 (01 Jul 2025 08:10) (45 - 68)  BP: 132/60 (01 Jul 2025 08:10) (124/53 - 155/77)  BP(mean): 84 (01 Jul 2025 05:00) (77 - 84)  RR: 18 (01 Jul 2025 08:10) (10 - 18)  SpO2: 95% (01 Jul 2025 08:10) (94% - 100%)    Physical Exam:  Constitutional: NAD, AAOx3  Cardiovascular: +S1S2 RRR  Pulmonary: CTA b/l, unlabored  GI: soft NTND +BS  Extremities: no pedal edema,   Right and left groin: no hematoma or ecchymosis noted.   Neuro: non focal, JOLLEY x4    LABS:                        12.1   10.32 )-----------( 199      ( 01 Jul 2025 05:50 )             35.8     138  |  105  |  22.9[H]  ----------------------------<  169[H]  4.2   |  23.0  |  0.90  Ca    8.6      01 Jul 2025 05:50  Mg     1.9     07-01  PT/INR - ( 30 Jun 2025 08:44 )   PT: 12.4 sec;   INR: 1.10 ratio    PTT - ( 30 Jun 2025 08:44 )  PTT:33.3 sec    A/P  79 year old male with HTN, GERD, Vertigo, Stroke (MCA clot), s/p MDT ILR implantation on 4/25/24 and Paroxysmal A.fib on Eliquis who is now POD#1 s/p FARAPULSE pulsed field ablation of atrial fibrillation (PVI, posterior wall) with access obtained via b/l femoral veins    - Discharge home today.

## 2025-07-01 NOTE — DISCHARGE NOTE NURSING/CASE MANAGEMENT/SOCIAL WORK - PATIENT PORTAL LINK FT
You can access the FollowMyHealth Patient Portal offered by [facility] by registering at the following website: [email or web address]. By joining [employer]’s FollowMyHealth portal, you will also be able to view your health information using other applications (apps) compatible with our system.

## 2025-07-01 NOTE — DISCHARGE NOTE NURSING/CASE MANAGEMENT/SOCIAL WORK - NSDCPEELIQUISFU_GEN_ALL_CORE
Left message for patient to return call.    Go for blood tests as directed. Your doctor will do lab tests at regular visits to monitor the effects of this medicine. Please follow up with your doctor and keep your health care provider appointments.

## 2025-07-01 NOTE — DISCHARGE NOTE NURSING/CASE MANAGEMENT/SOCIAL WORK - FINANCIAL ASSISTANCE
[facility] provides services at a reduced cost to those who are determined to be eligible through [facility]’s financial assistance program. Information regarding [facility]’s financial assistance program can be found by going to [email or web address] or by calling [phone].

## 2025-07-07 ENCOUNTER — APPOINTMENT (OUTPATIENT)
Dept: CARDIOLOGY | Facility: CLINIC | Age: 80
End: 2025-07-07
Payer: MEDICARE

## 2025-07-07 VITALS
DIASTOLIC BLOOD PRESSURE: 67 MMHG | WEIGHT: 190 LBS | BODY MASS INDEX: 28.14 KG/M2 | SYSTOLIC BLOOD PRESSURE: 131 MMHG | RESPIRATION RATE: 16 BRPM | HEIGHT: 69 IN | HEART RATE: 63 BPM

## 2025-07-07 PROBLEM — R42 DIZZINESS AND GIDDINESS: Chronic | Status: ACTIVE | Noted: 2025-06-30

## 2025-07-07 PROBLEM — I63.9 CEREBRAL INFARCTION, UNSPECIFIED: Chronic | Status: ACTIVE | Noted: 2025-06-30

## 2025-07-07 PROBLEM — I48.0 PAROXYSMAL ATRIAL FIBRILLATION: Chronic | Status: ACTIVE | Noted: 2025-06-30

## 2025-07-07 PROCEDURE — 99214 OFFICE O/P EST MOD 30 MIN: CPT

## 2025-07-07 PROCEDURE — 93000 ELECTROCARDIOGRAM COMPLETE: CPT

## 2025-07-22 ENCOUNTER — NON-APPOINTMENT (OUTPATIENT)
Age: 80
End: 2025-07-22

## 2025-07-22 ENCOUNTER — APPOINTMENT (OUTPATIENT)
Dept: ELECTROPHYSIOLOGY | Facility: CLINIC | Age: 80
End: 2025-07-22
Payer: MEDICARE

## 2025-07-22 PROCEDURE — 93298 REM INTERROG DEV EVAL SCRMS: CPT

## 2025-07-24 ENCOUNTER — APPOINTMENT (OUTPATIENT)
Dept: ORTHOPEDIC SURGERY | Facility: CLINIC | Age: 80
End: 2025-07-24
Payer: MEDICARE

## 2025-07-24 VITALS — WEIGHT: 190 LBS | HEIGHT: 69 IN | BODY MASS INDEX: 28.14 KG/M2

## 2025-07-24 DIAGNOSIS — M20.019 DISPLACED FRACTURE OF DISTAL PHALANX OF UNSPECIFIED FINGER, INITIAL ENCOUNTER FOR CLOSED FRACTURE: ICD-10-CM

## 2025-07-24 DIAGNOSIS — S62.639A DISPLACED FRACTURE OF DISTAL PHALANX OF UNSPECIFIED FINGER, INITIAL ENCOUNTER FOR CLOSED FRACTURE: ICD-10-CM

## 2025-07-24 PROCEDURE — 99213 OFFICE O/P EST LOW 20 MIN: CPT

## 2025-07-24 PROCEDURE — 73140 X-RAY EXAM OF FINGER(S): CPT | Mod: RT

## 2025-08-26 ENCOUNTER — APPOINTMENT (OUTPATIENT)
Dept: ELECTROPHYSIOLOGY | Facility: CLINIC | Age: 80
End: 2025-08-26

## 2025-08-26 ENCOUNTER — NON-APPOINTMENT (OUTPATIENT)
Age: 80
End: 2025-08-26

## 2025-08-26 PROCEDURE — 93298 REM INTERROG DEV EVAL SCRMS: CPT
